# Patient Record
Sex: FEMALE | Race: WHITE | HISPANIC OR LATINO | Employment: UNEMPLOYED | URBAN - METROPOLITAN AREA
[De-identification: names, ages, dates, MRNs, and addresses within clinical notes are randomized per-mention and may not be internally consistent; named-entity substitution may affect disease eponyms.]

---

## 2024-01-23 ENCOUNTER — HOSPITAL ENCOUNTER (INPATIENT)
Facility: MEDICAL CENTER | Age: 29
LOS: 17 days | End: 2024-02-09
Attending: OBSTETRICS & GYNECOLOGY | Admitting: OBSTETRICS & GYNECOLOGY
Payer: COMMERCIAL

## 2024-01-23 DIAGNOSIS — R10.9 POSTOPERATIVE ABDOMINAL PAIN: ICD-10-CM

## 2024-01-23 DIAGNOSIS — G89.18 POSTOPERATIVE ABDOMINAL PAIN: ICD-10-CM

## 2024-01-23 LAB
A1 MICROGLOB PLACENTAL VAG QL: POSITIVE
ABO GROUP BLD: NORMAL
ALBUMIN SERPL BCP-MCNC: 3.5 G/DL (ref 3.2–4.9)
ALBUMIN/GLOB SERPL: 1 G/DL
ALP SERPL-CCNC: 82 U/L (ref 30–99)
ALT SERPL-CCNC: 7 U/L (ref 2–50)
ANION GAP SERPL CALC-SCNC: 13 MMOL/L (ref 7–16)
AST SERPL-CCNC: 16 U/L (ref 12–45)
BASOPHILS # BLD AUTO: 0.1 % (ref 0–1.8)
BASOPHILS # BLD AUTO: 0.3 % (ref 0–1.8)
BASOPHILS # BLD: 0.02 K/UL (ref 0–0.12)
BASOPHILS # BLD: 0.03 K/UL (ref 0–0.12)
BILIRUB SERPL-MCNC: <0.2 MG/DL (ref 0.1–1.5)
BLD GP AB SCN SERPL QL: NORMAL
BUN SERPL-MCNC: 8 MG/DL (ref 8–22)
CALCIUM ALBUM COR SERPL-MCNC: 9.8 MG/DL (ref 8.5–10.5)
CALCIUM SERPL-MCNC: 9.4 MG/DL (ref 8.5–10.5)
CHLORIDE SERPL-SCNC: 107 MMOL/L (ref 96–112)
CO2 SERPL-SCNC: 19 MMOL/L (ref 20–33)
CREAT SERPL-MCNC: 0.42 MG/DL (ref 0.5–1.4)
CRYSTALS AMN MICRO: NORMAL
CRYSTALS AMN MICRO: NORMAL
EOSINOPHIL # BLD AUTO: 0 K/UL (ref 0–0.51)
EOSINOPHIL # BLD AUTO: 0.06 K/UL (ref 0–0.51)
EOSINOPHIL NFR BLD: 0 % (ref 0–6.9)
EOSINOPHIL NFR BLD: 0.5 % (ref 0–6.9)
ERYTHROCYTE [DISTWIDTH] IN BLOOD BY AUTOMATED COUNT: 43.8 FL (ref 35.9–50)
ERYTHROCYTE [DISTWIDTH] IN BLOOD BY AUTOMATED COUNT: 45.2 FL (ref 35.9–50)
GFR SERPLBLD CREATININE-BSD FMLA CKD-EPI: 135 ML/MIN/1.73 M 2
GLOBULIN SER CALC-MCNC: 3.4 G/DL (ref 1.9–3.5)
GLUCOSE SERPL-MCNC: 83 MG/DL (ref 65–99)
HBV SURFACE AG SER QL: ABNORMAL
HCT VFR BLD AUTO: 35.7 % (ref 37–47)
HCT VFR BLD AUTO: 36.9 % (ref 37–47)
HCV AB SER QL: ABNORMAL
HGB BLD-MCNC: 12.6 G/DL (ref 12–16)
HGB BLD-MCNC: 12.7 G/DL (ref 12–16)
HIV 1+2 AB+HIV1 P24 AG SERPL QL IA: NORMAL
HOLDING TUBE BB 8507: NORMAL
IMM GRANULOCYTES # BLD AUTO: 0.07 K/UL (ref 0–0.11)
IMM GRANULOCYTES # BLD AUTO: 0.1 K/UL (ref 0–0.11)
IMM GRANULOCYTES NFR BLD AUTO: 0.6 % (ref 0–0.9)
IMM GRANULOCYTES NFR BLD AUTO: 0.7 % (ref 0–0.9)
LYMPHOCYTES # BLD AUTO: 1.25 K/UL (ref 1–4.8)
LYMPHOCYTES # BLD AUTO: 2.27 K/UL (ref 1–4.8)
LYMPHOCYTES NFR BLD: 20.7 % (ref 22–41)
LYMPHOCYTES NFR BLD: 8.6 % (ref 22–41)
MAGNESIUM SERPL-MCNC: 1.8 MG/DL (ref 1.5–2.5)
MAGNESIUM SERPL-MCNC: 3.9 MG/DL (ref 1.5–2.5)
MAGNESIUM SERPL-MCNC: 4.7 MG/DL (ref 1.5–2.5)
MCH RBC QN AUTO: 31.3 PG (ref 27–33)
MCH RBC QN AUTO: 31.5 PG (ref 27–33)
MCHC RBC AUTO-ENTMCNC: 34.4 G/DL (ref 32.2–35.5)
MCHC RBC AUTO-ENTMCNC: 35.3 G/DL (ref 32.2–35.5)
MCV RBC AUTO: 89.3 FL (ref 81.4–97.8)
MCV RBC AUTO: 90.9 FL (ref 81.4–97.8)
MONOCYTES # BLD AUTO: 0.12 K/UL (ref 0–0.85)
MONOCYTES # BLD AUTO: 0.63 K/UL (ref 0–0.85)
MONOCYTES NFR BLD AUTO: 0.8 % (ref 0–13.4)
MONOCYTES NFR BLD AUTO: 5.7 % (ref 0–13.4)
NEUTROPHILS # BLD AUTO: 13.08 K/UL (ref 1.82–7.42)
NEUTROPHILS # BLD AUTO: 7.93 K/UL (ref 1.82–7.42)
NEUTROPHILS NFR BLD: 72.2 % (ref 44–72)
NEUTROPHILS NFR BLD: 89.8 % (ref 44–72)
NRBC # BLD AUTO: 0 K/UL
NRBC # BLD AUTO: 0 K/UL
NRBC BLD-RTO: 0 /100 WBC (ref 0–0.2)
NRBC BLD-RTO: 0 /100 WBC (ref 0–0.2)
PLATELET # BLD AUTO: 205 K/UL (ref 164–446)
PLATELET # BLD AUTO: 207 K/UL (ref 164–446)
PMV BLD AUTO: 12 FL (ref 9–12.9)
PMV BLD AUTO: 12.1 FL (ref 9–12.9)
POTASSIUM SERPL-SCNC: 3.7 MMOL/L (ref 3.6–5.5)
PROT SERPL-MCNC: 6.9 G/DL (ref 6–8.2)
RBC # BLD AUTO: 4 M/UL (ref 4.2–5.4)
RBC # BLD AUTO: 4.06 M/UL (ref 4.2–5.4)
RH BLD: NORMAL
RUBV AB SER QL: 56.5 IU/ML
SODIUM SERPL-SCNC: 139 MMOL/L (ref 135–145)
T PALLIDUM AB SER QL IA: ABNORMAL
T PALLIDUM AB SER QL IA: NORMAL
WBC # BLD AUTO: 11 K/UL (ref 4.8–10.8)
WBC # BLD AUTO: 14.6 K/UL (ref 4.8–10.8)

## 2024-01-23 PROCEDURE — 87389 HIV-1 AG W/HIV-1&-2 AB AG IA: CPT

## 2024-01-23 PROCEDURE — 84112 EVAL AMNIOTIC FLUID PROTEIN: CPT

## 2024-01-23 PROCEDURE — 36415 COLL VENOUS BLD VENIPUNCTURE: CPT

## 2024-01-23 PROCEDURE — 700102 HCHG RX REV CODE 250 W/ 637 OVERRIDE(OP): Performed by: OBSTETRICS & GYNECOLOGY

## 2024-01-23 PROCEDURE — 86803 HEPATITIS C AB TEST: CPT

## 2024-01-23 PROCEDURE — 87150 DNA/RNA AMPLIFIED PROBE: CPT

## 2024-01-23 PROCEDURE — 700105 HCHG RX REV CODE 258: Performed by: OBSTETRICS & GYNECOLOGY

## 2024-01-23 PROCEDURE — 86780 TREPONEMA PALLIDUM: CPT | Mod: 91

## 2024-01-23 PROCEDURE — 86901 BLOOD TYPING SEROLOGIC RH(D): CPT

## 2024-01-23 PROCEDURE — 85025 COMPLETE CBC W/AUTO DIFF WBC: CPT

## 2024-01-23 PROCEDURE — 770002 HCHG ROOM/CARE - OB PRIVATE (112)

## 2024-01-23 PROCEDURE — 86900 BLOOD TYPING SEROLOGIC ABO: CPT

## 2024-01-23 PROCEDURE — 80053 COMPREHEN METABOLIC PANEL: CPT

## 2024-01-23 PROCEDURE — 86850 RBC ANTIBODY SCREEN: CPT

## 2024-01-23 PROCEDURE — 302449 STATCHG TRIAGE ONLY (STATISTIC)

## 2024-01-23 PROCEDURE — 83735 ASSAY OF MAGNESIUM: CPT | Mod: 91

## 2024-01-23 PROCEDURE — 302790 HCHG STAT ANTEPARTUM CARE, DAILY

## 2024-01-23 PROCEDURE — A9270 NON-COVERED ITEM OR SERVICE: HCPCS | Performed by: OBSTETRICS & GYNECOLOGY

## 2024-01-23 PROCEDURE — 89060 EXAM SYNOVIAL FLUID CRYSTALS: CPT

## 2024-01-23 PROCEDURE — 86762 RUBELLA ANTIBODY: CPT

## 2024-01-23 PROCEDURE — 99223 1ST HOSP IP/OBS HIGH 75: CPT | Performed by: OBSTETRICS & GYNECOLOGY

## 2024-01-23 PROCEDURE — 87340 HEPATITIS B SURFACE AG IA: CPT

## 2024-01-23 PROCEDURE — 86592 SYPHILIS TEST NON-TREP QUAL: CPT

## 2024-01-23 PROCEDURE — 700111 HCHG RX REV CODE 636 W/ 250 OVERRIDE (IP): Performed by: OBSTETRICS & GYNECOLOGY

## 2024-01-23 PROCEDURE — 87081 CULTURE SCREEN ONLY: CPT

## 2024-01-23 RX ORDER — BETAMETHASONE SODIUM PHOSPHATE AND BETAMETHASONE ACETATE 3; 3 MG/ML; MG/ML
12 INJECTION, SUSPENSION INTRA-ARTICULAR; INTRALESIONAL; INTRAMUSCULAR; SOFT TISSUE EVERY 24 HOURS
Status: COMPLETED | OUTPATIENT
Start: 2024-01-23 | End: 2024-01-24

## 2024-01-23 RX ORDER — MAGNESIUM SULFATE HEPTAHYDRATE 40 MG/ML
2 INJECTION, SOLUTION INTRAVENOUS CONTINUOUS
Status: DISCONTINUED | OUTPATIENT
Start: 2024-01-23 | End: 2024-01-25

## 2024-01-23 RX ORDER — OXYTOCIN 10 [USP'U]/ML
10 INJECTION, SOLUTION INTRAMUSCULAR; INTRAVENOUS
Status: DISCONTINUED | OUTPATIENT
Start: 2024-01-23 | End: 2024-02-06 | Stop reason: HOSPADM

## 2024-01-23 RX ORDER — ONDANSETRON 2 MG/ML
4 INJECTION INTRAMUSCULAR; INTRAVENOUS EVERY 6 HOURS PRN
Status: DISCONTINUED | OUTPATIENT
Start: 2024-01-23 | End: 2024-02-06 | Stop reason: HOSPADM

## 2024-01-23 RX ORDER — LIDOCAINE HYDROCHLORIDE 10 MG/ML
20 INJECTION, SOLUTION INFILTRATION; PERINEURAL
Status: DISCONTINUED | OUTPATIENT
Start: 2024-01-23 | End: 2024-02-06 | Stop reason: HOSPADM

## 2024-01-23 RX ORDER — AMOXICILLIN 250 MG/1
250 CAPSULE ORAL EVERY 8 HOURS
Status: COMPLETED | OUTPATIENT
Start: 2024-01-25 | End: 2024-01-30

## 2024-01-23 RX ORDER — ALUMINA, MAGNESIA, AND SIMETHICONE 2400; 2400; 240 MG/30ML; MG/30ML; MG/30ML
10 SUSPENSION ORAL EVERY 6 HOURS PRN
Status: DISCONTINUED | OUTPATIENT
Start: 2024-01-23 | End: 2024-02-06

## 2024-01-23 RX ORDER — DOCUSATE SODIUM 100 MG/1
100 CAPSULE, LIQUID FILLED ORAL 2 TIMES DAILY
Status: DISCONTINUED | OUTPATIENT
Start: 2024-01-23 | End: 2024-02-06

## 2024-01-23 RX ORDER — AMOXICILLIN 250 MG
1 CAPSULE ORAL 2 TIMES DAILY
Status: DISCONTINUED | OUTPATIENT
Start: 2024-01-23 | End: 2024-02-06

## 2024-01-23 RX ORDER — VITAMIN A ACETATE, BETA CAROTENE, ASCORBIC ACID, CHOLECALCIFEROL, .ALPHA.-TOCOPHEROL ACETATE, DL-, THIAMINE MONONITRATE, RIBOFLAVIN, NIACINAMIDE, PYRIDOXINE HYDROCHLORIDE, FOLIC ACID, CYANOCOBALAMIN, CALCIUM CARBONATE, FERROUS FUMARATE, ZINC OXIDE, CUPRIC OXIDE 3080; 12; 120; 400; 1; 1.84; 3; 20; 22; 920; 25; 200; 27; 10; 2 [IU]/1; UG/1; MG/1; [IU]/1; MG/1; MG/1; MG/1; MG/1; MG/1; [IU]/1; MG/1; MG/1; MG/1; MG/1; MG/1
1 TABLET, FILM COATED ORAL
Status: DISCONTINUED | OUTPATIENT
Start: 2024-01-24 | End: 2024-02-06

## 2024-01-23 RX ORDER — AZITHROMYCIN 250 MG/1
1000 TABLET, FILM COATED ORAL ONCE
Status: COMPLETED | OUTPATIENT
Start: 2024-01-23 | End: 2024-01-23

## 2024-01-23 RX ORDER — ONDANSETRON 4 MG/1
4 TABLET, ORALLY DISINTEGRATING ORAL EVERY 6 HOURS PRN
Status: DISCONTINUED | OUTPATIENT
Start: 2024-01-23 | End: 2024-02-06 | Stop reason: HOSPADM

## 2024-01-23 RX ORDER — TERBUTALINE SULFATE 1 MG/ML
0.25 INJECTION, SOLUTION SUBCUTANEOUS
Status: DISCONTINUED | OUTPATIENT
Start: 2024-01-23 | End: 2024-02-06 | Stop reason: HOSPADM

## 2024-01-23 RX ORDER — CALCIUM GLUCONATE 94 MG/ML
1 INJECTION, SOLUTION INTRAVENOUS
Status: DISCONTINUED | OUTPATIENT
Start: 2024-01-23 | End: 2024-02-05

## 2024-01-23 RX ORDER — ALUMINA, MAGNESIA, AND SIMETHICONE 2400; 2400; 240 MG/30ML; MG/30ML; MG/30ML
30 SUSPENSION ORAL EVERY 6 HOURS PRN
Status: DISCONTINUED | OUTPATIENT
Start: 2024-01-23 | End: 2024-01-23

## 2024-01-23 RX ORDER — VITAMIN A ACETATE, BETA CAROTENE, ASCORBIC ACID, CHOLECALCIFEROL, .ALPHA.-TOCOPHEROL ACETATE, DL-, THIAMINE MONONITRATE, RIBOFLAVIN, NIACINAMIDE, PYRIDOXINE HYDROCHLORIDE, FOLIC ACID, CYANOCOBALAMIN, CALCIUM CARBONATE, FERROUS FUMARATE, ZINC OXIDE, CUPRIC OXIDE 3080; 12; 120; 400; 1; 1.84; 3; 20; 22; 920; 25; 200; 27; 10; 2 [IU]/1; UG/1; MG/1; [IU]/1; MG/1; MG/1; MG/1; MG/1; MG/1; [IU]/1; MG/1; MG/1; MG/1; MG/1; MG/1
1 TABLET, FILM COATED ORAL
Status: DISCONTINUED | OUTPATIENT
Start: 2024-01-24 | End: 2024-01-23

## 2024-01-23 RX ORDER — ACETAMINOPHEN 325 MG/1
650 TABLET ORAL EVERY 6 HOURS PRN
Status: DISCONTINUED | OUTPATIENT
Start: 2024-01-23 | End: 2024-02-06

## 2024-01-23 RX ORDER — MAGNESIUM SULFATE HEPTAHYDRATE 40 MG/ML
4 INJECTION, SOLUTION INTRAVENOUS ONCE
Status: COMPLETED | OUTPATIENT
Start: 2024-01-23 | End: 2024-01-23

## 2024-01-23 RX ORDER — CALCIUM CARBONATE 500 MG/1
500 TABLET, CHEWABLE ORAL 4 TIMES DAILY PRN
Status: DISCONTINUED | OUTPATIENT
Start: 2024-01-23 | End: 2024-02-09 | Stop reason: HOSPADM

## 2024-01-23 RX ORDER — SODIUM CHLORIDE, SODIUM LACTATE, POTASSIUM CHLORIDE, CALCIUM CHLORIDE 600; 310; 30; 20 MG/100ML; MG/100ML; MG/100ML; MG/100ML
INJECTION, SOLUTION INTRAVENOUS CONTINUOUS
Status: DISCONTINUED | OUTPATIENT
Start: 2024-01-23 | End: 2024-02-06

## 2024-01-23 RX ADMIN — SODIUM CHLORIDE, POTASSIUM CHLORIDE, SODIUM LACTATE AND CALCIUM CHLORIDE: 600; 310; 30; 20 INJECTION, SOLUTION INTRAVENOUS at 13:30

## 2024-01-23 RX ADMIN — MAGNESIUM SULFATE IN WATER 2 G/HR: 40 INJECTION, SOLUTION INTRAVENOUS at 13:53

## 2024-01-23 RX ADMIN — DOCUSATE SODIUM 100 MG: 100 CAPSULE, LIQUID FILLED ORAL at 18:15

## 2024-01-23 RX ADMIN — AZITHROMYCIN DIHYDRATE 1000 MG: 250 TABLET, FILM COATED ORAL at 15:22

## 2024-01-23 RX ADMIN — DOCUSATE SODIUM 50 MG AND SENNOSIDES 8.6 MG 1 TABLET: 8.6; 5 TABLET, FILM COATED ORAL at 18:15

## 2024-01-23 RX ADMIN — AMPICILLIN SODIUM 2000 MG: 2 INJECTION, POWDER, FOR SOLUTION INTRAVENOUS at 15:26

## 2024-01-23 RX ADMIN — BETAMETHASONE SODIUM PHOSPHATE AND BETAMETHASONE ACETATE 12 MG: 3; 3 INJECTION, SUSPENSION INTRA-ARTICULAR; INTRALESIONAL; INTRAMUSCULAR at 13:36

## 2024-01-23 RX ADMIN — AMPICILLIN SODIUM 2000 MG: 2 INJECTION, POWDER, FOR SOLUTION INTRAVENOUS at 21:55

## 2024-01-23 RX ADMIN — MAGNESIUM SULFATE HEPTAHYDRATE 4 G: 4 INJECTION, SOLUTION INTRAVENOUS at 13:33

## 2024-01-23 ASSESSMENT — LIFESTYLE VARIABLES
HOW MANY TIMES IN THE PAST YEAR HAVE YOU HAD 5 OR MORE DRINKS IN A DAY: 0
AVERAGE NUMBER OF DAYS PER WEEK YOU HAVE A DRINK CONTAINING ALCOHOL: 0
CONSUMPTION TOTAL: NEGATIVE
TOTAL SCORE: 0
ON A TYPICAL DAY WHEN YOU DRINK ALCOHOL HOW MANY DRINKS DO YOU HAVE: 0
ALCOHOL_USE: NO
HAVE YOU EVER FELT YOU SHOULD CUT DOWN ON YOUR DRINKING: NO
TOTAL SCORE: 0
TOTAL SCORE: 0
EVER HAD A DRINK FIRST THING IN THE MORNING TO STEADY YOUR NERVES TO GET RID OF A HANGOVER: NO
EVER FELT BAD OR GUILTY ABOUT YOUR DRINKING: NO
HAVE PEOPLE ANNOYED YOU BY CRITICIZING YOUR DRINKING: NO
EVER_SMOKED: NEVER

## 2024-01-23 ASSESSMENT — PATIENT HEALTH QUESTIONNAIRE - PHQ9
2. FEELING DOWN, DEPRESSED, IRRITABLE, OR HOPELESS: NOT AT ALL
SUM OF ALL RESPONSES TO PHQ9 QUESTIONS 1 AND 2: 0
2. FEELING DOWN, DEPRESSED, IRRITABLE, OR HOPELESS: NOT AT ALL
1. LITTLE INTEREST OR PLEASURE IN DOING THINGS: NOT AT ALL
SUM OF ALL RESPONSES TO PHQ9 QUESTIONS 1 AND 2: 0
1. LITTLE INTEREST OR PLEASURE IN DOING THINGS: NOT AT ALL

## 2024-01-23 ASSESSMENT — PAIN SCALES - GENERAL: PAINLEVEL: 0 - NO PAIN

## 2024-01-23 NOTE — CONSULTS
"Maternal Fetal Medicine Consultation    Date of Admission: 24    ID: 28 y.o.  with IUP at 27.5 weeks on date of admission    Primary OB: OB Group in [place]    Reason for consultation: Advanced cervical dilation    HPI: Tiffanie Lujan is a 28 y.o.  at 27.5 weeks admitted with advanced cervical dilation. She has been receiving prenatal care from OBs in [place] but we have very limited records to review (only an ultrasound report). She says she has been seeing them this whole pregnancy but was in [place] for the holidays. She had an ultrasound there last month that reportedly demonstrated a cervical length of 1cm and she was started on vaginal progesterone. She was seen by the OB's in [place] last week and there was concern for cervical funneling and dilation. She was not admitted to the hospital there but was referred to my office where I saw her today.     Ultrasound today demonstrates a breech fetus with funneling of the cervix noted transabdominally. There is funnelling to the external os with the internal os 1.5cm dilated on ultrasound and the external os 2.5cm dilated. She reports a several day history of leakage of clear fluid so a vaginal exam was not done in the office but the MARV was noted to be normal at 11cm. She denies VB. She reports contractions every 10 minutes all day yesterday but only a couple contractions so far today. She denies F/C.      She speaks Sinhala - in office interpretation provided today.    ANC:  -negative NIPT per patient    ROS: 10 systems reviewed and negative except as noted in HPI    OBSTETRIC HISTORY:   FT  x 2, 6-7 pounds. Reports retained placenta after last delivery requiring D&C.     PAST MEDICAL HISTORY: None    PAST SURGICAL HISTORY: Dilation and curettage    SOCIAL HISTORY: Neg x 3, lives in [place]    FAMILY HISTORY: NC    Medications:   PNV  Vaginal progesterone    Allergies:  NKDA    O: /78, BMI 28.3, Ht 5'5\", weight " 170lb, HR 100bpm  Gen: NAD, AAO  CV: well-perfused  Resp: Non-labored  Abd: Gravid, NTTP, No rebound or guarding. No fundal tenderness  Ext: NTTP, edema, 2+DPP  Pelvic: SVE deferred in office due to reported leakage an inability to do a full in office PPROM evaluation    US : Breech EFW 26% 1061g. Posterior placenta without previa. MARV 11.9cm.    Assessment:  - IUP at 27.5 weeks  - Advanced cervical dilation  - Possible PPROM  - Breech presentation  - History of retained placenta in G2 delivery requiring D&C    Plan:  - Eval for PPROM (spec, Amnisure, fern). Latency abx if evidence of PPROM.  - BMZ -  - Magnesium sulfate for neuroprotection  - Neonatology consultation  - MOD - c/s unless fetus becomes cephalic    Reviewed recommendation for inpatient management given advanced dilation and high risk of  delivery. The patient lives over two hours from a facility with a NICU so does need to be inpatient at this time. Patient signed out to my partner Dr. Mota who is on-call for the hospital. Patient also discussed with Dr. Oliveros - appreciate their group's assistance in care of the patient/delivery if indicated during this admission.     Eva Lee MD

## 2024-01-23 NOTE — PROGRESS NOTES
Medication history reviewed with PT and her  at bedside. Her  interpreted.    Med rec is complete per PT and  reporting    Allergies reviewed.     Patient denies any outpatient antibiotics in the last 30 days.     Patient is not taking anticoagulants.    Preferred pharmacy for this visit - [facility] in [place] ([phone])

## 2024-01-23 NOTE — H&P
Admission history and physical;    Tiffanie Lujan is a 28 y.o. female  at 27w5d.  Patient was seen and evaluated by [facility]  maternal-fetal medicine specialist Dr. Lee this morning and ultrasound shows internal cervical os dilated to 1.5 cm patient complaining of infrequent uterine contractions but also complains of possible leakage of fluid x 72 hours.  The patient lives and has been followed by her physician in [facility].  On admission, patient denies uterine contractions.  States she is having good fetal movements denies abdominal pain fever or chills.  Patient does not have a history of  labor during this pregnancy until this week.    Patient has a history of 2 prior term deliveries    Past medical history-negative  Past surgical history-D&C after delivery due to retained products of conception  No known drug allergies  Medications-prenatal vitamins      Patient Active Problem List    Diagnosis Date Noted    Labor and delivery, indication for care 2024       Review of systems; denies vaginal bleeding, leakage of fluid, uterine contractions, fever chills or abdominal pain  History reviewed. No pertinent past medical history.  Past Surgical History:   Procedure Laterality Date    OTHER      D&C, 40 days postpartum in      Patient has no known allergies.  Social History     Socioeconomic History    Marital status:      Spouse name: Not on file    Number of children: Not on file    Years of education: Not on file    Highest education level: Not on file   Occupational History    Not on file   Tobacco Use    Smoking status: Never    Smokeless tobacco: Never   Vaping Use    Vaping Use: Never used   Substance and Sexual Activity    Alcohol use: Not Currently     Comment: occasional    Drug use: Never    Sexual activity: Not on file   Other Topics Concern    Not on file   Social History Narrative    Not on file     Social Determinants of Health     Financial Resource Strain: Not  "on file   Food Insecurity: Not on file   Transportation Needs: Not on file   Physical Activity: Not on file   Stress: Not on file   Social Connections: Not on file   Intimate Partner Violence: Not on file   Housing Stability: Not on file         Physical examination;  Alert and oriented x3  Gen.-well-developed well-nourished female in no apparent distress  HEENT-normocephalic, nontraumatic,EOMI,PERRLA  /69   Pulse 91   Temp 36.7 °C (98 °F) (Temporal)   Resp 16   Ht 1.651 m (5' 5\")   Wt 76.2 kg (167 lb 15.9 oz)   SpO2 96%   BMI 27.96 kg/m²   Skin is warm and dry  Back-negative for CVA tenderness  Cardiovascular-regular rate and rhythm, normal S1-S2 no murmurs gallops  Lungs-clear to auscultation bilaterally  Abdomen-nondistended positive bowel sounds soft nontender without masses or hepatosplenomegaly  Cervix-sterile speculum exam performed by CRISTIANE bautista and AmniSure collected-very small amount of fluid and vaginal discharge noted in posterior vaginal fornix  Extremities without cyanosis clubbing or edema  Neurologic grossly intact    Labs; AmniSure positive, ferning negative  Breech presentation on ultrasound, estimated fetal weight 1061 g posterior placenta without previa  MARV 11.9    NST-as performed and read by myself; reactive NST without contractions    Impression;  IUP AT 27w5d   cervical dilatation   premature spontaneous rupture membranes-?  Breech  Desire for permanent sterilization    Plan;  NICU consult requested-discussed case with Dr. Petty  Latency antibiotics  Betamethasone ,   Magnesium sulfate  Patient has private insurance-bilateral salpingectomy will be performed as desired if  sections performed  Expectant management until 34 weeks    Carter Oliveros MD  "

## 2024-01-24 LAB
GP B STREP DNA SPEC QL NAA+PROBE: NEGATIVE
MAGNESIUM SERPL-MCNC: 5.5 MG/DL (ref 1.5–2.5)
MAGNESIUM SERPL-MCNC: 5.7 MG/DL (ref 1.5–2.5)
MAGNESIUM SERPL-MCNC: 6 MG/DL (ref 1.5–2.5)

## 2024-01-24 PROCEDURE — 770002 HCHG ROOM/CARE - OB PRIVATE (112)

## 2024-01-24 PROCEDURE — A9270 NON-COVERED ITEM OR SERVICE: HCPCS | Performed by: OBSTETRICS & GYNECOLOGY

## 2024-01-24 PROCEDURE — 83735 ASSAY OF MAGNESIUM: CPT | Mod: 91

## 2024-01-24 PROCEDURE — 700111 HCHG RX REV CODE 636 W/ 250 OVERRIDE (IP): Performed by: OBSTETRICS & GYNECOLOGY

## 2024-01-24 PROCEDURE — 36415 COLL VENOUS BLD VENIPUNCTURE: CPT

## 2024-01-24 PROCEDURE — 302790 HCHG STAT ANTEPARTUM CARE, DAILY

## 2024-01-24 PROCEDURE — 700105 HCHG RX REV CODE 258: Performed by: OBSTETRICS & GYNECOLOGY

## 2024-01-24 PROCEDURE — 700102 HCHG RX REV CODE 250 W/ 637 OVERRIDE(OP): Performed by: OBSTETRICS & GYNECOLOGY

## 2024-01-24 RX ADMIN — AMPICILLIN SODIUM 2000 MG: 2 INJECTION, POWDER, FOR SOLUTION INTRAVENOUS at 03:20

## 2024-01-24 RX ADMIN — DOCUSATE SODIUM 50 MG AND SENNOSIDES 8.6 MG 1 TABLET: 8.6; 5 TABLET, FILM COATED ORAL at 17:57

## 2024-01-24 RX ADMIN — AMPICILLIN SODIUM 2000 MG: 2 INJECTION, POWDER, FOR SOLUTION INTRAVENOUS at 20:43

## 2024-01-24 RX ADMIN — BETAMETHASONE SODIUM PHOSPHATE AND BETAMETHASONE ACETATE 12 MG: 3; 3 INJECTION, SUSPENSION INTRA-ARTICULAR; INTRALESIONAL; INTRAMUSCULAR at 14:05

## 2024-01-24 RX ADMIN — AMPICILLIN SODIUM 2000 MG: 2 INJECTION, POWDER, FOR SOLUTION INTRAVENOUS at 14:38

## 2024-01-24 RX ADMIN — DOCUSATE SODIUM 100 MG: 100 CAPSULE, LIQUID FILLED ORAL at 17:57

## 2024-01-24 RX ADMIN — AMPICILLIN SODIUM 2000 MG: 2 INJECTION, POWDER, FOR SOLUTION INTRAVENOUS at 09:16

## 2024-01-24 RX ADMIN — MAGNESIUM SULFATE IN WATER 2 G/HR: 40 INJECTION, SOLUTION INTRAVENOUS at 09:02

## 2024-01-24 RX ADMIN — SODIUM CHLORIDE, POTASSIUM CHLORIDE, SODIUM LACTATE AND CALCIUM CHLORIDE: 600; 310; 30; 20 INJECTION, SOLUTION INTRAVENOUS at 16:47

## 2024-01-24 RX ADMIN — DOCUSATE SODIUM 100 MG: 100 CAPSULE, LIQUID FILLED ORAL at 06:23

## 2024-01-24 RX ADMIN — DOCUSATE SODIUM 50 MG AND SENNOSIDES 8.6 MG 1 TABLET: 8.6; 5 TABLET, FILM COATED ORAL at 06:23

## 2024-01-24 RX ADMIN — SODIUM CHLORIDE, POTASSIUM CHLORIDE, SODIUM LACTATE AND CALCIUM CHLORIDE: 600; 310; 30; 20 INJECTION, SOLUTION INTRAVENOUS at 02:12

## 2024-01-24 RX ADMIN — VITAMIN A, VITAMIN C, VITAMIN D, VITAMIN E, THIAMINE, RIBOFLAVIN, NIACIN, VITAMIN B6, FOLIC ACID, VITAMIN B12, CALCIUM, IRON, ZINC, COPPER 1 TABLET: 4000; 120; 400; 22; 1.84; 3; 20; 10; 1; 12; 200; 27; 25; 2 TABLET ORAL at 09:14

## 2024-01-24 NOTE — PROGRESS NOTES
1900_ assumed pt care. Report from Barbie TREVINO RN. POC reviewed and understanding verbalized.     2000_ Assessment completed. Pt denies UC's, LOF or VB. She reports +FM.    2015_ Dr Mota @ bedside. SSVE performed by MD. Shepherd collected and sent to lab.    0700_ Report to CRISTIANE Block

## 2024-01-24 NOTE — PROGRESS NOTES
"Event Note    S:  Pt is a 29 yo  at 27 5/7 weeks gestation (working LUANA 24) currently admitted with evidence of  cervical dilation and possible PPROM. (Encounter and exam performed with female RN at bedside as chaperone). Pt lives in [facility].     Pt reports leakage of clear discharge since 24. MARV today in clinic within normal limits with ultrasound evidence of roughly 2.5 cm of dilation at the external cervical os. Fetus noted to be in BREECH presentation at that time.    The patient denies any concern for CTX, VB, or irritation currently. Denies f/c, N/V. Reports active fetal movement.     Amnisure from admission positive and negative fern, prompting repeat assessment now.     O:  /68   Pulse (!) 102   Temp 36.9 °C (98.5 °F) (Temporal)   Resp 15   Ht 1.651 m (5' 5\")   Wt 76.2 kg (167 lb 15.9 oz)   SpO2 94%   BMI 27.96 kg/m²     Gen: well appearing, NAD  CV: regular rhythm   Pulm: normal work of breathing.   Abd: soft, gravid, NT, ND  GYN (exam performed with female RN at bedside as chaperone) - normal external genitalia and vaginal epithelia without lesions. Cervix visualized incompletely due to redundant vaginal tissue. Cervix appeared to be at least 3 cm dilated with prolapsing membranes at level of external os. Physiologic appearing discharge present, without obvious pooling. Discharge sent to lab for evaluation of ferning. No nitrazine paper available to aid in assessment.     EFM - 130 bpm / moderate variability / + accelerations / no decelerations.   Holt - rare irritability / CTX. Most recently quiet.     A/P - 29 yo  at 27w5d admitted with asymptomatic advanced cervical dilation and possible rupture of membranes (LOF since 24 pre pt report). Reviewed with patient high risk for  delivery and rupture of membranes, if this has not already occurred, given evidence of dilation on exam. Patient met with Neonatology earlier today who reviewed with the " patient risks associated with prematurity. At this time patient denies any questions in this regard. Currently denies symptoms of infection, abruption, or labor. Plan for ongoing close observation as inpatient.     S/p Neonatology consultation earlier today.   Continued Magnesium for neuroprotection.  BMZ -  Reasonable to continue Latency antibiotics for now given equivocal PPROM work-up, including my repeat exam now.  Follow-up repeat fern collected now.   FM: CFM  MOD -  delivery unless fetus becomes cephalic.     Dispo: plan for ongoing inpatient management. Case discussed with Dr. Oliveros ([facility]). Greatly appreciate ongoing help in management/ delivery if indicated during admission.     Immanuel Mota MD  [facility]

## 2024-01-24 NOTE — PROGRESS NOTES
1350 Report received from CRISTIANE Block. Care assumed    1654 Phlebotomy contacted about scheduled lab draw.    1645 Pt denies any HA, blurry vision, or pain in RUQ.    1905 Report given to CRISTIANE Ramirez. Care relinquished.

## 2024-01-24 NOTE — PROGRESS NOTES
0700: Report received from CRISTIANE Reddy.   0730: Pt reports +FM, denies contractions but endorses short episode of cramping overnight. Pt reports leaking a small amount of clear fluid, denies bleeding. Pt educated to call RN with any concerns.  1350: Report given to CRISTIANE Bryant.

## 2024-01-24 NOTE — PROGRESS NOTES
LUANA: 2024 27.5 weeks    1202: Pt presents to L&D after being sent over from [facility]. Pt with noted shortened cervix and dilated per US. Pt reports possible LOF on  (24) and occasional UCs. Denies VB. Vital signs obtained. External monitors applied. Discussed POC with pt and family. Encouraged pt to call with needs.     1333: Magnesium sulfate bolus initiated. (See MAR).     1600: Neonatologist at bedside. POC discussed.     : Report given to Maureen BORGES RN. POC discussed.

## 2024-01-24 NOTE — CONSULTS
Asked to talk with this family by Dr Oliveros regarding Possible delivery at 27-5/7 weeks gestation.    Mother is 27 YO,  currently @ 27-5/7 weeks presents with PTL, dilated to 1.5 cm and possible leakage of fluid for 72 hrs.    Parents were told about possible RDS which could mean anything from tachypnea to needing oxygen, CPAP or intubation and ventilation. They were told we could give the baby surfactant if the baby is intubated. They were told that most babies are only on the ventilator for a short period of time, but some babies develop CLD which could cause the baby to be ventilated for a prolonged period of time and could also cause asthma-like wheezing.  They were told about a possible PDA which could be treated medically or surgically.  They were told about possible NEC, which could be treated medically or surgically. They were told about the protective effects of MBM . Mother is planning on supplying MBM for the baby and will consent to DBM  They were told about possible feeding intolerance  They were told about possible IVH and the need for U/S. They were told even with out IVH, the baby is at risk for brain injury and developmental delay, MRCP, autism, ADHD, learning disabilities, etc.  They were told about possible ROP, the need for eye exams and possible need for laser surgery.  They were told that the baby would likely be in the NICU until the LUANA, but could go home sooner or stay longer depending on the clinical course    If they have any further questions please contact me    Marcus Petty MD  Staff neonatologist  [facility].

## 2024-01-24 NOTE — NST NOTE
EFM Note (FHT reviewed overnight)    Tiffanie Lujan   Gestational age: 27w6d     Indication: Advanced cervical dilation and questionable PPROM.     NST Interpretation:  Baseline 125 bpm / moderate variability / + accerlerations / no decelerations.   Edmund: no contractions.    Plan: CFM through BMZ with intermittent breaks.     Immanuel Mota MD  [facility]

## 2024-01-24 NOTE — PROGRESS NOTES
KIN  PROGRESS NOTE    DATE: 24  Gestational Age: 27w6d, Estimated Date of Delivery: 24    Primary OB/GYN: PNC with OB in [place]. Current care with [facility].     SUBJECTIVE  Tiffanie Lujan is a 28 y.o.  at 27w6d (working LUANA 24) admitted with advanced cervical dilation and possible ROM. (Language Line  # [id number])    Patient endorses active fetal movement. Reports intermittent cramping / pressure around 0430 AM overnight. Currently denies any cramping, contractions, pelvic pressure, or vaginal bleeding. Has continued to indorse a small amount of clear discharged without associated odor or irritation. Denies headaches, shortness of breath, chest pain, or abdominal pain. Reports feeling mildly dizzy this AM. Voiding without issues.    OBJECTIVE:  Vitals:    24 0715   BP: 116/78   Pulse: 99   Resp: 16   Temp: 36.4 °C (97.6 °F)   SpO2: 94%      SpO2 - 98% on room air at the bedside.    PHYSICAL EXAM:  General:   Alert, conversational, pleasant, no acute distress  Lungs:   Normal work of breathing  Heart:   RRR, no mrg.  Abdomen:  Soft, gravid, non-tender, non-distended    OB Ultrasound:  Outpatient ultrasound (24) - BREECH, EFW 1061g (26%), posterior placenta without previa. MARV 11.9 cm.      FHT: See separate NST report    Medications:   Current Facility-Administered Medications   Medication Dose Route Frequency Provider Last Rate Last Admin    lidocaine (XYLOCAINE) 1%  injection  20 mL Subcutaneous Once PRN Carter Oliveros M.D.        terbutaline (Brethine) injection 0.25 mg  0.25 mg Subcutaneous Once PRN Carter Oliveros M.D.        oxytocin (Pitocin) infusion bolus (for post delivery)  20 Units Intravenous Once Carter Oliveros M.D.   Held at 24 1300    Followed by    oxytocin (Pitocin) infusion (for post delivery)  125 mL/hr Intravenous Continuous Carter Oliveros M.D.   Held at 24 1345    oxytocin (Pitocin) injection 10 Units   10 Units Intramuscular Once PRN Carter Oliveros M.D.        calcium GLUConate injection 1 g  1 g Intravenous Once PRN Carter Oliveros M.D.        magnesium sulfate 40 g/1000mL infusion  2 g/hr Intravenous Continuous Carter Oliveros M.D. 50 mL/hr at 01/23/24 1353 2 g/hr at 01/23/24 1353    ondansetron (Zofran ODT) dispertab 4 mg  4 mg Oral Q6HRS PRN Carter Oliveros M.D.        Or    ondansetron (Zofran) syringe/vial injection 4 mg  4 mg Intravenous Q6HRS PRN Carter Oliveros M.D.        acetaminophen (Tylenol) tablet 650 mg  650 mg Oral Q6HRS PRN Carter Oliveros M.D.        calcium carbonate (Tums) chewable tab 500 mg  500 mg Oral 4X/DAY PRN Carter Oliveros M.D.        betamethasone acetate-betamethasone sodium phosphate (Celestone) injection 12 mg  12 mg Intramuscular Q24HR Carter Oliveros M.D.   12 mg at 01/23/24 1336    lactated ringers infusion   Intravenous Continuous Carter Oliveros M.D. 75 mL/hr at 01/24/24 0212 New Bag at 01/24/24 0212    ampicillin (Omnipen) 2,000 mg in  mL IVPB  2,000 mg Intravenous Q6HR Carter Oliveros M.D.   Stopped at 01/24/24 0350    Followed by    [START ON 1/25/2024] amoxicillin (Amoxil) capsule 250 mg  250 mg Oral Q8HRS Carter Oliveros M.D.        prenatal plus vitamin (Stuartnatal 1+1) 27-1 MG tablet 1 Tablet  1 Tablet Oral Daily-0800 Carter Oliveros M.D.        docusate sodium (Colace) capsule 100 mg  100 mg Oral BID Carter Oliveros M.D.   100 mg at 01/24/24 0623    senna-docusate (Pericolace Or Senokot S) 8.6-50 MG per tablet 1 Tablet  1 Tablet Oral BID Carter Oliveros M.D.   1 Tablet at 01/24/24 0623    mag hydrox-al hydrox-simeth (Maalox Plus Es Or Mylanta Ds) suspension 10 mL  10 mL Oral Q6HRS PRN Carter Oliveros M.D.            Labs:   Recent Results (from the past 24 hour(s))   AMNISURE ROM ASSAY    Collection Time: 01/23/24 12:50 PM   Result Value Ref Range    AmniSure ROM Positive (AA) Negative   FERN TEST    Collection Time: 01/23/24 12:50 PM   Result Value Ref Range     Fern Test On Amniotic Fluid see below Not present   Hold Blood Bank Specimen (Not Tested)    Collection Time: 01/23/24  1:22 PM   Result Value Ref Range    Holding Tube - Bb DONE    CBC with differential    Collection Time: 01/23/24  1:22 PM   Result Value Ref Range    WBC 11.0 (H) 4.8 - 10.8 K/uL    RBC 4.06 (L) 4.20 - 5.40 M/uL    Hemoglobin 12.7 12.0 - 16.0 g/dL    Hematocrit 36.9 (L) 37.0 - 47.0 %    MCV 90.9 81.4 - 97.8 fL    MCH 31.3 27.0 - 33.0 pg    MCHC 34.4 32.2 - 35.5 g/dL    RDW 45.2 35.9 - 50.0 fL    Platelet Count 205 164 - 446 K/uL    MPV 12.0 9.0 - 12.9 fL    Neutrophils-Polys 72.20 (H) 44.00 - 72.00 %    Lymphocytes 20.70 (L) 22.00 - 41.00 %    Monocytes 5.70 0.00 - 13.40 %    Eosinophils 0.50 0.00 - 6.90 %    Basophils 0.30 0.00 - 1.80 %    Immature Granulocytes 0.60 0.00 - 0.90 %    Nucleated RBC 0.00 0.00 - 0.20 /100 WBC    Neutrophils (Absolute) 7.93 (H) 1.82 - 7.42 K/uL    Lymphs (Absolute) 2.27 1.00 - 4.80 K/uL    Monos (Absolute) 0.63 0.00 - 0.85 K/uL    Eos (Absolute) 0.06 0.00 - 0.51 K/uL    Baso (Absolute) 0.03 0.00 - 0.12 K/uL    Immature Granulocytes (abs) 0.07 0.00 - 0.11 K/uL    NRBC (Absolute) 0.00 K/uL   T.PALLIDUM AB RUPA (Syphilis)    Collection Time: 01/23/24  1:22 PM   Result Value Ref Range    Syphilis, Treponemal Qual Non-Reactive Non-Reactive   COMP METABOLIC PANEL    Collection Time: 01/23/24  1:22 PM   Result Value Ref Range    Sodium 139 135 - 145 mmol/L    Potassium 3.7 3.6 - 5.5 mmol/L    Chloride 107 96 - 112 mmol/L    Co2 19 (L) 20 - 33 mmol/L    Anion Gap 13.0 7.0 - 16.0    Glucose 83 65 - 99 mg/dL    Bun 8 8 - 22 mg/dL    Creatinine 0.42 (L) 0.50 - 1.40 mg/dL    Calcium 9.4 8.5 - 10.5 mg/dL    Correct Calcium 9.8 8.5 - 10.5 mg/dL    AST(SGOT) 16 12 - 45 U/L    ALT(SGPT) 7 2 - 50 U/L    Alkaline Phosphatase 82 30 - 99 U/L    Total Bilirubin <0.2 0.1 - 1.5 mg/dL    Albumin 3.5 3.2 - 4.9 g/dL    Total Protein 6.9 6.0 - 8.2 g/dL    Globulin 3.4 1.9 - 3.5 g/dL    A-G  Ratio 1.0 g/dL   SERUM MAGNESIUM LEVELS    Collection Time: 01/23/24  1:22 PM   Result Value Ref Range    Magnesium 1.8 1.5 - 2.5 mg/dL   ESTIMATED GFR    Collection Time: 01/23/24  1:22 PM   Result Value Ref Range    GFR (CKD-EPI) 135 >60 mL/min/1.73 m 2   OP Prenatal Panel-Blood Bank    Collection Time: 01/23/24  1:22 PM   Result Value Ref Range    ABO Grouping Only O     Rh Grouping Only POS     Antibody Screen Scrn NEG    SERUM MAGNESIUM LEVELS    Collection Time: 01/23/24  3:24 PM   Result Value Ref Range    Magnesium 3.9 (H) 1.5 - 2.5 mg/dL   Ferning if rupture of membranes suspected    Collection Time: 01/23/24  8:25 PM   Result Value Ref Range    Fern Test On Amniotic Fluid see below Not present   PRENATAL PANEL 3+HIV+HCV    Collection Time: 01/23/24 10:10 PM   Result Value Ref Range    WBC 14.6 (H) 4.8 - 10.8 K/uL    RBC 4.00 (L) 4.20 - 5.40 M/uL    Hemoglobin 12.6 12.0 - 16.0 g/dL    Hematocrit 35.7 (L) 37.0 - 47.0 %    MCV 89.3 81.4 - 97.8 fL    MCH 31.5 27.0 - 33.0 pg    MCHC 35.3 32.2 - 35.5 g/dL    RDW 43.8 35.9 - 50.0 fL    Platelet Count 207 164 - 446 K/uL    MPV 12.1 9.0 - 12.9 fL    Neutrophils-Polys 89.80 (H) 44.00 - 72.00 %    Lymphocytes 8.60 (L) 22.00 - 41.00 %    Monocytes 0.80 0.00 - 13.40 %    Eosinophils 0.00 0.00 - 6.90 %    Basophils 0.10 0.00 - 1.80 %    Immature Granulocytes 0.70 0.00 - 0.90 %    Nucleated RBC 0.00 0.00 - 0.20 /100 WBC    Neutrophils (Absolute) 13.08 (H) 1.82 - 7.42 K/uL    Lymphs (Absolute) 1.25 1.00 - 4.80 K/uL    Monos (Absolute) 0.12 0.00 - 0.85 K/uL    Eos (Absolute) 0.00 0.00 - 0.51 K/uL    Baso (Absolute) 0.02 0.00 - 0.12 K/uL    Immature Granulocytes (abs) 0.10 0.00 - 0.11 K/uL    NRBC (Absolute) 0.00 K/uL    Rubella IgG Antibody 56.50 IU/mL    Hepatitis B Surface Antigen Non-Reactive Non-Reactive    Hepatitis C Antibody Non-Reactive Non-Reactive    Syphilis, Treponemal Qual Non-Reactive Non-Reactive   HIV AG/AB COMBO ASSAY SCREENING    Collection Time: 01/23/24  10:10 PM   Result Value Ref Range    HIV Ag/Ab Combo Assay Non-Reactive Non Reactive   SERUM MAGNESIUM LEVELS    Collection Time: 24 10:10 PM   Result Value Ref Range    Magnesium 4.7 (H) 1.5 - 2.5 mg/dL   SERUM MAGNESIUM LEVELS    Collection Time: 24  4:26 AM   Result Value Ref Range    Magnesium 5.7 (H) 1.5 - 2.5 mg/dL       Imaging: No results found.    ASSESSMENT & PLAN  29 yo  at 27w6d (working LUANA 24) admitted with asymptomatic advanced cervical dilation and possible rupture of membranes (LOF since 24 pre pt report). Currently denies symptoms of infection, abruption, or labor. Plan for ongoing close observation as inpatient.     Assessment:  - IUP at 27w6d  - Advanced cervical dilation  - Possible PPROM - + amnisure / negative fern on repeat exam overnight.  - Breech presentation  - History of retained placenta in G2 delivery requiring D&C    Plan:   S/p Neonatology consultation 24.   Continued Magnesium for neuroprotection. Last Mag level 5.6. Adequate UOPT noted. Next Mag level ~ 10 AM.   BMZ -  Reasonable to continue Latency antibiotics given equivocal work-up.  FM: CFM  MOD -  delivery unless fetus becomes cephalic.     Plan of care discussed with the patient and she is in agreement with this plan. All questions and concerns were answered.     Doug Mota M.D.

## 2024-01-25 LAB
GLUCOSE BLD STRIP.AUTO-MCNC: 117 MG/DL (ref 65–99)
GLUCOSE BLD STRIP.AUTO-MCNC: 160 MG/DL (ref 65–99)
MAGNESIUM SERPL-MCNC: 2.8 MG/DL (ref 1.5–2.5)

## 2024-01-25 PROCEDURE — 302790 HCHG STAT ANTEPARTUM CARE, DAILY

## 2024-01-25 PROCEDURE — 82962 GLUCOSE BLOOD TEST: CPT | Mod: 91

## 2024-01-25 PROCEDURE — 770002 HCHG ROOM/CARE - OB PRIVATE (112)

## 2024-01-25 PROCEDURE — 700105 HCHG RX REV CODE 258: Performed by: OBSTETRICS & GYNECOLOGY

## 2024-01-25 PROCEDURE — 83735 ASSAY OF MAGNESIUM: CPT

## 2024-01-25 PROCEDURE — 59025 FETAL NON-STRESS TEST: CPT

## 2024-01-25 PROCEDURE — 36415 COLL VENOUS BLD VENIPUNCTURE: CPT

## 2024-01-25 PROCEDURE — A9270 NON-COVERED ITEM OR SERVICE: HCPCS | Performed by: OBSTETRICS & GYNECOLOGY

## 2024-01-25 PROCEDURE — 700111 HCHG RX REV CODE 636 W/ 250 OVERRIDE (IP): Performed by: OBSTETRICS & GYNECOLOGY

## 2024-01-25 PROCEDURE — 700102 HCHG RX REV CODE 250 W/ 637 OVERRIDE(OP): Performed by: OBSTETRICS & GYNECOLOGY

## 2024-01-25 RX ADMIN — AMOXICILLIN 250 MG: 250 CAPSULE ORAL at 21:50

## 2024-01-25 RX ADMIN — AMPICILLIN SODIUM 2000 MG: 2 INJECTION, POWDER, FOR SOLUTION INTRAVENOUS at 09:01

## 2024-01-25 RX ADMIN — DOCUSATE SODIUM 100 MG: 100 CAPSULE, LIQUID FILLED ORAL at 06:13

## 2024-01-25 RX ADMIN — AMPICILLIN SODIUM 2000 MG: 2 INJECTION, POWDER, FOR SOLUTION INTRAVENOUS at 02:42

## 2024-01-25 RX ADMIN — DOCUSATE SODIUM 50 MG AND SENNOSIDES 8.6 MG 1 TABLET: 8.6; 5 TABLET, FILM COATED ORAL at 06:13

## 2024-01-25 RX ADMIN — AMOXICILLIN 250 MG: 250 CAPSULE ORAL at 13:56

## 2024-01-25 RX ADMIN — DOCUSATE SODIUM 50 MG AND SENNOSIDES 8.6 MG 1 TABLET: 8.6; 5 TABLET, FILM COATED ORAL at 18:21

## 2024-01-25 RX ADMIN — DOCUSATE SODIUM 100 MG: 100 CAPSULE, LIQUID FILLED ORAL at 18:21

## 2024-01-25 RX ADMIN — VITAMIN A, VITAMIN C, VITAMIN D, VITAMIN E, THIAMINE, RIBOFLAVIN, NIACIN, VITAMIN B6, FOLIC ACID, VITAMIN B12, CALCIUM, IRON, ZINC, COPPER 1 TABLET: 4000; 120; 400; 22; 1.84; 3; 20; 10; 1; 12; 200; 27; 25; 2 TABLET ORAL at 07:45

## 2024-01-25 ASSESSMENT — PATIENT HEALTH QUESTIONNAIRE - PHQ9
2. FEELING DOWN, DEPRESSED, IRRITABLE, OR HOPELESS: NOT AT ALL
SUM OF ALL RESPONSES TO PHQ9 QUESTIONS 1 AND 2: 0
1. LITTLE INTEREST OR PLEASURE IN DOING THINGS: NOT AT ALL

## 2024-01-25 NOTE — PROGRESS NOTES
1000 - Report received from Neva SAUER. Care assumed.   1610 - Pt resting comfortably. Pt states continuing to leak clear fluid with some white discharge. No changes in odor or color, denies UCs, +FM.   1900 - Report given to Lydia SAUER.

## 2024-01-25 NOTE — CARE PLAN
The patient is Stable - Low risk of patient condition declining or worsening    Shift Goals  Clinical Goals: manage Mag sulfate, reassuring FHR  Patient Goals: healthy mom  Family Goals: support    Progress made toward(s) clinical / shift goals:    Problem: Knowledge Deficit - L&D  Goal: Patient and family/caregivers will demonstrate understanding of plan of care, disease process/condition, diagnostic tests and medications  Outcome: Progressing     Problem: Pain  Goal: Patient's pain will be alleviated or reduced to the patient’s comfort goal  Outcome: Progressing     Problem: Risk for Fluid Imbalance  Goal: Patient's fluid volume balance will be maintained or improve  Outcome: Progressing       Patient is not progressing towards the following goals:

## 2024-01-25 NOTE — CARE PLAN
Problem: Knowledge Deficit - L&D  Goal: Patient and family/caregivers will demonstrate understanding of plan of care, disease process/condition, diagnostic tests and medications  Outcome: Progressing     Problem: Psychosocial - L&D  Goal: Patient's level of anxiety will decrease  Outcome: Progressing  Goal: Patient will be able to discuss coping skills during hospitalization  Outcome: Progressing  Goal: Spiritual and cultural needs incorporated into hospitalization  Outcome: Progressing     Problem: Pain  Goal: Patient's pain will be alleviated or reduced to the patient’s comfort goal  Outcome: Progressing     Problem: Risk for Infection and Impaired Wound Healing  Goal: Patient will remain free from infection  Outcome: Progressing   The patient is Watcher - Medium risk of patient condition declining or worsening    Shift Goals  Clinical Goals: stay pregnant, remain infection free  Patient Goals: stay pregnant  Family Goals: provide support

## 2024-01-25 NOTE — PROGRESS NOTES
KIN  PROGRESS NOTE    DATE: 24  Gestational Age: 28w0d, Estimated Date of Delivery: 24    Primary OB/GYN: PNC with OB in [place]. Current care with [facility].     SUBJECTIVE  Tiffanie Lujan is a 28 y.o.  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 (Language Line  # [id number])    Patient endorses active fetal movement. Denies any cramping, contractions, pelvic pressure, or vaginal bleeding currently. Reports some pressure overnight around 3 AM. Has continued to indorse a small amount of discharged without associated odor or irritation. Pt reports labia feel somewhat swollen this AM. Denies headaches, shortness of breath, chest pain, or abdominal pain. Reports normal urinary and bowel function.     OBJECTIVE:  Vitals:    24 0739   BP: 108/65   Pulse: 86   Resp: 17   Temp: 36.6 °C (97.9 °F)   SpO2:       PHYSICAL EXAM:  General:   Alert, conversational, pleasant, no acute distress  Lungs:   Normal work of breathing, CTAB.   Heart:   RRR, no mrg.  Abdomen:  Soft, gravid, non-tender, non-distended  GYN (with female RN at bedside as chaperone) - External genitalia without any evidence of swelling or lesions. Evidence of physiologic appearing white discharge with no appreciable odor.     OB Ultrasound:  Outpatient ultrasound (24) - [facility], EFW 1061g (26%), posterior placenta without previa. MARV 11.9 cm.      FHT: See separate NST report    Medications:   Current Facility-Administered Medications   Medication Dose Route Frequency Provider Last Rate Last Admin    lidocaine (XYLOCAINE) 1%  injection  20 mL Subcutaneous Once PRN Carter Oliveros M.D.        terbutaline (Brethine) injection 0.25 mg  0.25 mg Subcutaneous Once PRN Carter Oliveros M.D.        oxytocin (Pitocin) infusion (for post delivery)  125 mL/hr Intravenous Continuous Carter Oliveros M.D.   Held at 24 1345    oxytocin (Pitocin) injection  10 Units  10 Units Intramuscular Once PRN Carter Oliveros M.D.        calcium GLUConate injection 1 g  1 g Intravenous Once PRN Carter Oliveros M.D.        magnesium sulfate 40 g/1000mL infusion  2 g/hr Intravenous Continuous Carter Oliveros M.D.   Stopped at 01/24/24 2100    ondansetron (Zofran ODT) dispertab 4 mg  4 mg Oral Q6HRS PRN Carter Oliveros M.D.        Or    ondansetron (Zofran) syringe/vial injection 4 mg  4 mg Intravenous Q6HRS PRN Carter Oliveros M.D.        acetaminophen (Tylenol) tablet 650 mg  650 mg Oral Q6HRS PRN Carter Oliveros M.D.        calcium carbonate (Tums) chewable tab 500 mg  500 mg Oral 4X/DAY PRN Carter Oliveros M.D.        lactated ringers infusion   Intravenous Continuous Carter Oliveros M.D. 75 mL/hr at 01/24/24 1647 New Bag at 01/24/24 1647    ampicillin (Omnipen) 2,000 mg in  mL IVPB  2,000 mg Intravenous Q6HR Carter Oliveros M.D.   Stopped at 01/25/24 0312    Followed by    amoxicillin (Amoxil) capsule 250 mg  250 mg Oral Q8HRS Carter Oliveros M.D.        prenatal plus vitamin (Stuartnatal 1+1) 27-1 MG tablet 1 Tablet  1 Tablet Oral Daily-0800 Carter Oliveros M.D.   1 Tablet at 01/25/24 0745    docusate sodium (Colace) capsule 100 mg  100 mg Oral BID Carter Oliveros M.D.   100 mg at 01/25/24 0613    senna-docusate (Pericolace Or Senokot S) 8.6-50 MG per tablet 1 Tablet  1 Tablet Oral BID Carter Oliveros M.D.   1 Tablet at 01/25/24 0613    mag hydrox-al hydrox-simeth (Maalox Plus Es Or Mylanta Ds) suspension 10 mL  10 mL Oral Q6HRS PRN Carter Oliveros M.D.            Labs:   Recent Results (from the past 24 hour(s))   SERUM MAGNESIUM LEVELS    Collection Time: 01/24/24 10:55 AM   Result Value Ref Range    Magnesium 6.0 (H) 1.5 - 2.5 mg/dL   SERUM MAGNESIUM LEVELS    Collection Time: 01/24/24  5:00 PM   Result Value Ref Range    Magnesium 5.5 (H) 1.5 - 2.5 mg/dL   MAGNESIUM    Collection Time: 01/25/24  5:09 AM   Result Value Ref Range    Magnesium 2.8 (H) 1.5 - 2.5 mg/dL        Imaging: No results found.    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 Currently denies symptoms of infection, abruption, or labor. Plan for ongoing close observation as inpatient.     Assessment:  - IUP at 28w0d  - Advanced cervical dilation  - Possible PPROM - + amnisure / negative fern on repeat exam overnight.  - Breech presentation  - History of retained placenta in G2 delivery requiring D&C    Plan:   - S/p Neonatology consultation 24.   - S/p Magnesium for fetal neuro protection. Would plan to restart if concerns develop for labor or moving toward delivery < 32 weeks gestation.   - S/p BMZ -  - QID glucose checks, as patient has not yet completed 1hr GCT in pregnancy. Consider completing 1 hr GCT 7-10 days after BMZ.  Reasonable to continue Latency antibiotics given equivocal rupture work-up.  - Encouraged pt to update OB team immediately with any concerns regarding decreased FM, LOF, VB, labor symptoms, or any concerns.  FM: After discussion with patient regarding pros/cons, the patient has elected to transition from CFM to 1hr of fetal monitoring q shift, or with any maternal concerns.   MOD -  delivery unless fetus becomes cephalic.   VTE PPX - ambulation encouraged / SCD's recommended in bed.     Plan of care discussed with the patient and she is in agreement with this plan. All questions and concerns were answered.     [place] Mota M.D.

## 2024-01-25 NOTE — CARE PLAN
The patient is Stable - Low risk of patient condition declining or worsening         Progress made toward(s) clinical / shift goals:  Pt states no current ctx pain at this time, VSS.    Patient is not progressing towards the following goals:      Problem: Risk for Excess Fluid Volume  Goal: Patient will demonstrate pulse, blood pressure and neurologic signs within expected ranges and without any respiratory complications  Outcome: Progressing     Problem: Pain  Goal: Patient's pain will be alleviated or reduced to the patient’s comfort goal  Outcome: Progressing

## 2024-01-25 NOTE — PROGRESS NOTES
0700- Report received from CRISTIANE Ramirez. POC discussed and assumed.   0745- Assessment completed. Pt denies any pain or feeling contractions at this time. Pt reports +FM and denies any VB. She still feels like she is leaking a small amount of fluid and it has remained clear.   0750- Dr Mota at bedside to talk with and assess pt.  IPAD used. Orders to discontinue continuous monitoring and complete a 1 hr NST Q shift. Orders to allow pt to shower and complete fasting BS and 1hr PP. Pt agrees with plan.   1000- Report given to CRISTIANE Chadwick.

## 2024-01-25 NOTE — NST NOTE
EFM Note (FHT reviewed overnight)    Tiffanie Lujan   Gestational age: 27w6d     Indication: Advanced cervical dilation and questionable PPROM.     NST Interpretation:  Baseline 130 bpm / moderate variability / + accerlerations / no decelerations.   [place]: no contractions.    Plan: 1hr EFM q shift.    Immanuel Mota MD  [facility]

## 2024-01-25 NOTE — PROGRESS NOTES
1900: Received report from Manny HERNANDEZ RN.     1920:At bedside, pt denies ctx, does not feel LOF, + FM, - VB. Pt denies blurry vision, HA, and RUQ pain. Pt understands to use call light for any questions.    2050: Svetlana REGAN called to receive update on pt. Orders received to start Mag Sulfate.     2100: Mag stopped ( See MAR) Pt educated on why we are stopping Mag.    0700: Gave report to Neva SAUER

## 2024-01-26 LAB
ABO GROUP BLD: NORMAL
BASOPHILS # BLD AUTO: 0.2 % (ref 0–1.8)
BASOPHILS # BLD: 0.03 K/UL (ref 0–0.12)
BLD GP AB SCN SERPL QL: NORMAL
EOSINOPHIL # BLD AUTO: 0.03 K/UL (ref 0–0.51)
EOSINOPHIL NFR BLD: 0.2 % (ref 0–6.9)
ERYTHROCYTE [DISTWIDTH] IN BLOOD BY AUTOMATED COUNT: 46.9 FL (ref 35.9–50)
GLUCOSE BLD STRIP.AUTO-MCNC: 70 MG/DL (ref 65–99)
HCT VFR BLD AUTO: 35.3 % (ref 37–47)
HGB BLD-MCNC: 11.5 G/DL (ref 12–16)
IMM GRANULOCYTES # BLD AUTO: 0.1 K/UL (ref 0–0.11)
IMM GRANULOCYTES NFR BLD AUTO: 0.8 % (ref 0–0.9)
LYMPHOCYTES # BLD AUTO: 3.12 K/UL (ref 1–4.8)
LYMPHOCYTES NFR BLD: 24.9 % (ref 22–41)
MCH RBC QN AUTO: 30.6 PG (ref 27–33)
MCHC RBC AUTO-ENTMCNC: 32.6 G/DL (ref 32.2–35.5)
MCV RBC AUTO: 93.9 FL (ref 81.4–97.8)
MONOCYTES # BLD AUTO: 1.08 K/UL (ref 0–0.85)
MONOCYTES NFR BLD AUTO: 8.6 % (ref 0–13.4)
NEUTROPHILS # BLD AUTO: 8.17 K/UL (ref 1.82–7.42)
NEUTROPHILS NFR BLD: 65.3 % (ref 44–72)
NRBC # BLD AUTO: 0 K/UL
NRBC BLD-RTO: 0 /100 WBC (ref 0–0.2)
PLATELET # BLD AUTO: 197 K/UL (ref 164–446)
PMV BLD AUTO: 11.3 FL (ref 9–12.9)
RBC # BLD AUTO: 3.76 M/UL (ref 4.2–5.4)
RH BLD: NORMAL
WBC # BLD AUTO: 12.5 K/UL (ref 4.8–10.8)

## 2024-01-26 PROCEDURE — 86850 RBC ANTIBODY SCREEN: CPT

## 2024-01-26 PROCEDURE — 770002 HCHG ROOM/CARE - OB PRIVATE (112)

## 2024-01-26 PROCEDURE — 302790 HCHG STAT ANTEPARTUM CARE, DAILY

## 2024-01-26 PROCEDURE — 59025 FETAL NON-STRESS TEST: CPT

## 2024-01-26 PROCEDURE — 86900 BLOOD TYPING SEROLOGIC ABO: CPT

## 2024-01-26 PROCEDURE — 82962 GLUCOSE BLOOD TEST: CPT

## 2024-01-26 PROCEDURE — 700102 HCHG RX REV CODE 250 W/ 637 OVERRIDE(OP): Performed by: OBSTETRICS & GYNECOLOGY

## 2024-01-26 PROCEDURE — A9270 NON-COVERED ITEM OR SERVICE: HCPCS | Performed by: OBSTETRICS & GYNECOLOGY

## 2024-01-26 PROCEDURE — 36415 COLL VENOUS BLD VENIPUNCTURE: CPT

## 2024-01-26 PROCEDURE — 85025 COMPLETE CBC W/AUTO DIFF WBC: CPT

## 2024-01-26 PROCEDURE — 86901 BLOOD TYPING SEROLOGIC RH(D): CPT

## 2024-01-26 RX ADMIN — AMOXICILLIN 250 MG: 250 CAPSULE ORAL at 14:18

## 2024-01-26 RX ADMIN — AMOXICILLIN 250 MG: 250 CAPSULE ORAL at 22:09

## 2024-01-26 RX ADMIN — VITAMIN A, VITAMIN C, VITAMIN D, VITAMIN E, THIAMINE, RIBOFLAVIN, NIACIN, VITAMIN B6, FOLIC ACID, VITAMIN B12, CALCIUM, IRON, ZINC, COPPER 1 TABLET: 4000; 120; 400; 22; 1.84; 3; 20; 10; 1; 12; 200; 27; 25; 2 TABLET ORAL at 07:58

## 2024-01-26 RX ADMIN — AMOXICILLIN 250 MG: 250 CAPSULE ORAL at 06:01

## 2024-01-26 NOTE — PROGRESS NOTES
"1900: Report received from Mirta ERICKSON RN; POC discussed.    2040: In to see pt; spouse at bedside.  Pt confirms good fetal movements this evening. Pt also mentioned that she leaks \" a little bit when I get up to the bathroom.\" Pt denies cramping and any vaginal bleeding at this time.  Monitors applied for evening NST.    0600: FSBS= 70    0700: Report to Chapis KISER RN; POC discussed.  "

## 2024-01-26 NOTE — NST NOTE
NST Note - FHT reviewed from overnight    Tiffanie Lujan   Gestational age: 28w1d     Indication: admission with advanced cervical dilation.     NST Interpretation:  Baseline 140 bpm, moderate variability, + accelerations, no decelerations.   [place] - quiet     Plan: 1 hr EFM q shift.     Immanuel Mota MD

## 2024-01-26 NOTE — CARE PLAN
The patient is Watcher - Medium risk of patient condition declining or worsening    Shift Goals  Clinical Goals: Monitor s/s of infection  Patient Goals: Stay pregnant  Family Goals: Support    Progress made toward(s) clinical / shift goals:    Problem: Pain  Goal: Patient's pain will be alleviated or reduced to the patient’s comfort goal  Outcome: Progressing     Problem: Risk for Infection and Impaired Wound Healing  Goal: Patient will remain free from infection  Outcome: Progressing

## 2024-01-26 NOTE — PROGRESS NOTES
KIN  PROGRESS NOTE    DATE: 24  Gestational Age: 28w1d, Estimated Date of Delivery: 24    Primary OB/GYN: PNC with OB in [place]. Current care with [facility].     SUBJECTIVE  Tiffanie Lujan is a 28 y.o.  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 (Language Line  # [id number])    Patient endorses active fetal movement. Denies any cramping, contractions, pelvic pressure, or vaginal bleeding. Has continued to report a small amount of clear / thin discharged without associated odor or irritation. Denies headaches, shortness of breath, chest pain, or abdominal pain. Reports normal urinary and bowel function.     OBJECTIVE:  Vitals:    24 0800   BP: 105/61   Pulse: 88   Resp: 16   Temp: 36.4 °C (97.6 °F)   SpO2:       PHYSICAL EXAM:  General:   Alert, conversational, pleasant, no acute distress  Lungs:   Normal work of breathing, CTAB.   Heart:   RRR, no mrg.  Abdomen:  Soft, gravid, non-tender, non-distended    OB Ultrasound:  Outpatient ultrasound (24) - [facility], EFW 1061g (26%), posterior placenta without previa. MARV 11.9 cm.      FHT: See separate NST report    Medications:   Current Facility-Administered Medications   Medication Dose Route Frequency Provider Last Rate Last Admin    lidocaine (XYLOCAINE) 1%  injection  20 mL Subcutaneous Once PRN Carter Oliveros M.D.        terbutaline (Brethine) injection 0.25 mg  0.25 mg Subcutaneous Once PRN Carter Oliveros M.D.        oxytocin (Pitocin) infusion (for post delivery)  125 mL/hr Intravenous Continuous Carter Oliveros M.D.   Held at 24 1345    oxytocin (Pitocin) injection 10 Units  10 Units Intramuscular Once PRN Carter Oliveros M.D.        calcium GLUConate injection 1 g  1 g Intravenous Once PRN Carter Oliveros M.D.        ondansetron (Zofran ODT) dispertab 4 mg  4 mg Oral Q6HRS PRN Carter Oliveros M.D.        Or    ondansetron (Zofran)  syringe/vial injection 4 mg  4 mg Intravenous Q6HRS PRN Carter Oliveros M.D.        acetaminophen (Tylenol) tablet 650 mg  650 mg Oral Q6HRS PRN Carter Oliveros M.D.        calcium carbonate (Tums) chewable tab 500 mg  500 mg Oral 4X/DAY PRN Carter Oliveros M.D.        lactated ringers infusion   Intravenous Continuous Carter Oliveros M.D. 75 mL/hr at 24 1647 New Bag at 24 1647    amoxicillin (Amoxil) capsule 250 mg  250 mg Oral Q8HRS Carter Oliveros M.D.   250 mg at 24 0601    prenatal plus vitamin (Stuartnatal 1+1) 27-1 MG tablet 1 Tablet  1 Tablet Oral Daily-0800 Carter Oliveros M.D.   1 Tablet at 24 0758    docusate sodium (Colace) capsule 100 mg  100 mg Oral BID Carter Oliveros M.D.   100 mg at 24 1821    senna-docusate (Pericolace Or Senokot S) 8.6-50 MG per tablet 1 Tablet  1 Tablet Oral BID Carter Oliveros M.D.   1 Tablet at 24 1821    mag hydrox-al hydrox-simeth (Maalox Plus Es Or Mylanta Ds) suspension 10 mL  10 mL Oral Q6HRS PRN Carter Oliveros M.D.            Labs:   Recent Results (from the past 24 hour(s))   POCT glucose device results    Collection Time: 24  1:08 PM   Result Value Ref Range    POC Glucose, Blood 160 (H) 65 - 99 mg/dL   POCT glucose device results    Collection Time: 24  6:28 PM   Result Value Ref Range    POC Glucose, Blood 117 (H) 65 - 99 mg/dL   POCT glucose device results    Collection Time: 24  6:04 AM   Result Value Ref Range    POC Glucose, Blood 70 65 - 99 mg/dL       Imaging: No results found.    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 Currently denies symptoms of infection, abruption, or labor. Plan for ongoing close observation as inpatient.     Assessment:  - IUP at 28w1d  - Advanced cervical dilation  - Possible PPROM - + amnisure / negative fern on repeat exam overnight.  -  Breech presentation  - History of retained placenta in G2 delivery requiring D&C    Plan:   - S/p Neonatology consultation 24.   - S/p Magnesium for fetal neuro protection. Would plan to restart if concerns develop for labor or moving toward delivery < 32 weeks gestation.   - S/p BMZ -  - Continue to monitor QID glucose checks - intermittently elevated, but mostly at goal. Plan to completing 1 hr GCT 7-10 days after BMZ.  - Reasonable to continue Latency antibiotics given equivocal rupture work-up.  - Encouraged pt to update OB team immediately with any concerns regarding decreased FM, LOF, VB, labor symptoms, or any concerns.  FM: 1hr of fetal monitoring q shift, or with any maternal concerns.   MOD -  delivery unless fetus becomes cephalic.   VTE PPX - ambulation encouraged / SCD's recommended in bed.     Plan of care discussed with the patient and she is in agreement with this plan. All questions and concerns were answered.     [place] Mota M.D.

## 2024-01-27 PROCEDURE — 59025 FETAL NON-STRESS TEST: CPT

## 2024-01-27 PROCEDURE — 700102 HCHG RX REV CODE 250 W/ 637 OVERRIDE(OP): Performed by: OBSTETRICS & GYNECOLOGY

## 2024-01-27 PROCEDURE — 770002 HCHG ROOM/CARE - OB PRIVATE (112)

## 2024-01-27 PROCEDURE — 302790 HCHG STAT ANTEPARTUM CARE, DAILY

## 2024-01-27 PROCEDURE — A9270 NON-COVERED ITEM OR SERVICE: HCPCS | Performed by: OBSTETRICS & GYNECOLOGY

## 2024-01-27 RX ADMIN — DOCUSATE SODIUM 100 MG: 100 CAPSULE, LIQUID FILLED ORAL at 17:44

## 2024-01-27 RX ADMIN — AMOXICILLIN 250 MG: 250 CAPSULE ORAL at 22:00

## 2024-01-27 RX ADMIN — DOCUSATE SODIUM 100 MG: 100 CAPSULE, LIQUID FILLED ORAL at 05:54

## 2024-01-27 RX ADMIN — VITAMIN A, VITAMIN C, VITAMIN D, VITAMIN E, THIAMINE, RIBOFLAVIN, NIACIN, VITAMIN B6, FOLIC ACID, VITAMIN B12, CALCIUM, IRON, ZINC, COPPER 1 TABLET: 4000; 120; 400; 22; 1.84; 3; 20; 10; 1; 12; 200; 27; 25; 2 TABLET ORAL at 08:28

## 2024-01-27 RX ADMIN — DOCUSATE SODIUM 50 MG AND SENNOSIDES 8.6 MG 1 TABLET: 8.6; 5 TABLET, FILM COATED ORAL at 17:44

## 2024-01-27 RX ADMIN — AMOXICILLIN 250 MG: 250 CAPSULE ORAL at 14:35

## 2024-01-27 RX ADMIN — AMOXICILLIN 250 MG: 250 CAPSULE ORAL at 05:52

## 2024-01-27 RX ADMIN — DOCUSATE SODIUM 50 MG AND SENNOSIDES 8.6 MG 1 TABLET: 8.6; 5 TABLET, FILM COATED ORAL at 05:54

## 2024-01-27 NOTE — PROGRESS NOTES
0700: Received report from Alicia SAUER, plan of care discussed. Call light in reach, pt encouraged to use when in need of assistance. Pt requesting to have  translate instead of using .     1800: Report given to Tita SAUER, plan of care discussed.

## 2024-01-27 NOTE — NST NOTE
NST Note - FHT reviewed from this morning    Tiffanie Lujan   Gestational age: 28w2d     Indication: admission with advanced cervical dilation.     NST Interpretation:  Baseline 140 bpm, moderate variability, + accelerations, no decelerations.   [place] - quiet     Plan: 1 hr EFM q shift.

## 2024-01-27 NOTE — PROGRESS NOTES
MFM  PROGRESS NOTE    Primary OB/GYN: PNC with OB in [place]. Current care with [facility].     SUBJECTIVE  No overnight events. + FM. Denies VB and contractions. Patient reports small amount of ongoing clear/thin vaginal discharge. + FM.    ( service used)    OBJECTIVE:  Vitals:    24 0800   BP: 102/61   Pulse: 88   Resp: 16   Temp: 36.3 °C (97.4 °F)   SpO2:       PHYSICAL EXAM:  General:   Alert, conversational, pleasant, no acute distress  Lungs:   Normal work of breathing, CTAB.   Heart:   RRR, no mrg.  Abdomen:  Soft, gravid, non-tender, non-distended    OB Ultrasound:  Outpatient ultrasound (24) - [facility], EFW 1061g (26%), posterior placenta without previa. MARV 11.9 cm.      FHT: See separate NST report    Medications:   Current Facility-Administered Medications   Medication Dose Route Frequency Provider Last Rate Last Admin    lidocaine (XYLOCAINE) 1%  injection  20 mL Subcutaneous Once PRN Carter Oliveros M.D.        terbutaline (Brethine) injection 0.25 mg  0.25 mg Subcutaneous Once PRN Carter Oliveros M.D.        oxytocin (Pitocin) infusion (for post delivery)  125 mL/hr Intravenous Continuous Carter Oliveros M.D.   Held at 24 1345    oxytocin (Pitocin) injection 10 Units  10 Units Intramuscular Once PRN Carter Oliveros M.D.        calcium GLUConate injection 1 g  1 g Intravenous Once PRN Carter Oliveros M.D.        ondansetron (Zofran ODT) dispertab 4 mg  4 mg Oral Q6HRS PRN Carter Oliveros M.D.        Or    ondansetron (Zofran) syringe/vial injection 4 mg  4 mg Intravenous Q6HRS PRN Carter Oliveros M.D.        acetaminophen (Tylenol) tablet 650 mg  650 mg Oral Q6HRS PRN Carter Oliveros M.D.        calcium carbonate (Tums) chewable tab 500 mg  500 mg Oral 4X/DAY PRN Carter Oliveros M.D.        lactated ringers infusion   Intravenous Continuous Carter Oliveros M.D. 75 mL/hr at 24 1647 New Bag at 24 1647    amoxicillin (Amoxil) capsule 250  mg  250 mg Oral Q8HRS Carter Oliveros M.D.   250 mg at 24 0552    prenatal plus vitamin (Stuartnatal 1+1) 27-1 MG tablet 1 Tablet  1 Tablet Oral Daily-0800 Carter Oliveros M.D.   1 Tablet at 24 0828    docusate sodium (Colace) capsule 100 mg  100 mg Oral BID Carter Oliveros M.D.   100 mg at 24 0554    senna-docusate (Pericolace Or Senokot S) 8.6-50 MG per tablet 1 Tablet  1 Tablet Oral BID Carter Oliveros M.D.   1 Tablet at 24 0554    mag hydrox-al hydrox-simeth (Maalox Plus Es Or Mylanta Ds) suspension 10 mL  10 mL Oral Q6HRS PRN Carter Oliveros M.D.            Labs:   No results found for this or any previous visit (from the past 24 hour(s)).      Imaging: No results found.    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 Currently denies symptoms of infection, abruption, or labor. Plan for ongoing close observation as inpatient.     Assessment:  - IUP at 28w2d  - Advanced cervical dilation  - Possible PPROM - + amnisure / negative fern on repeat exam  - Breech presentation  - History of retained placenta in G2 delivery requiring D&C    Plan:   - S/p Neonatology consultation 24.   - S/p Magnesium for fetal neuro protection. Would plan to restart if concerns develop for labor or moving toward delivery < 32 weeks gestation.   - S/p BMZ -  - D/C accuchecks. Plan to completing 1 hr GCT 7-10 days after BMZ.  - Reasonable to continue Latency antibiotics given equivocal rupture work-up.  - MOD -  delivery unless fetus becomes cephalic.   - VTE PPX - ambulation encouraged / SCD's recommended in bed.     Plan of care discussed with the patient and she is in agreement with this plan. All questions and concerns were answered.     Eva Lee M.D.

## 2024-01-27 NOTE — PROGRESS NOTES
1900: Report received from Chapis KISER RN; POC discussed.     2040: In to see pt; monitors placed for evening NST.  Pt states she has leaked a little more than usual today. Pt confirms good fetal movements; denies uterine activity and any vaginal bleeding.  No further complaints.    2212: Evening NST completed; monitors removed.

## 2024-01-28 PROCEDURE — 302790 HCHG STAT ANTEPARTUM CARE, DAILY

## 2024-01-28 PROCEDURE — 700102 HCHG RX REV CODE 250 W/ 637 OVERRIDE(OP): Performed by: OBSTETRICS & GYNECOLOGY

## 2024-01-28 PROCEDURE — A9270 NON-COVERED ITEM OR SERVICE: HCPCS | Performed by: OBSTETRICS & GYNECOLOGY

## 2024-01-28 PROCEDURE — 770002 HCHG ROOM/CARE - OB PRIVATE (112)

## 2024-01-28 PROCEDURE — 59025 FETAL NON-STRESS TEST: CPT

## 2024-01-28 RX ADMIN — VITAMIN A, VITAMIN C, VITAMIN D, VITAMIN E, THIAMINE, RIBOFLAVIN, NIACIN, VITAMIN B6, FOLIC ACID, VITAMIN B12, CALCIUM, IRON, ZINC, COPPER 1 TABLET: 4000; 120; 400; 22; 1.84; 3; 20; 10; 1; 12; 200; 27; 25; 2 TABLET ORAL at 08:02

## 2024-01-28 RX ADMIN — DOCUSATE SODIUM 50 MG AND SENNOSIDES 8.6 MG 1 TABLET: 8.6; 5 TABLET, FILM COATED ORAL at 06:23

## 2024-01-28 RX ADMIN — AMOXICILLIN 250 MG: 250 CAPSULE ORAL at 22:40

## 2024-01-28 RX ADMIN — DOCUSATE SODIUM 100 MG: 100 CAPSULE, LIQUID FILLED ORAL at 18:04

## 2024-01-28 RX ADMIN — DOCUSATE SODIUM 100 MG: 100 CAPSULE, LIQUID FILLED ORAL at 06:23

## 2024-01-28 RX ADMIN — AMOXICILLIN 250 MG: 250 CAPSULE ORAL at 14:01

## 2024-01-28 RX ADMIN — AMOXICILLIN 250 MG: 250 CAPSULE ORAL at 06:23

## 2024-01-28 RX ADMIN — DOCUSATE SODIUM 50 MG AND SENNOSIDES 8.6 MG 1 TABLET: 8.6; 5 TABLET, FILM COATED ORAL at 18:04

## 2024-01-28 ASSESSMENT — PATIENT HEALTH QUESTIONNAIRE - PHQ9
1. LITTLE INTEREST OR PLEASURE IN DOING THINGS: NOT AT ALL
SUM OF ALL RESPONSES TO PHQ9 QUESTIONS 1 AND 2: 0
1. LITTLE INTEREST OR PLEASURE IN DOING THINGS: NOT AT ALL
SUM OF ALL RESPONSES TO PHQ9 QUESTIONS 1 AND 2: 0
2. FEELING DOWN, DEPRESSED, IRRITABLE, OR HOPELESS: NOT AT ALL
2. FEELING DOWN, DEPRESSED, IRRITABLE, OR HOPELESS: NOT AT ALL

## 2024-01-28 NOTE — PROGRESS NOTES
1800 Report received from Keesha RN, POC discussed.    1900 Report given to Nadia SAUER, POC discussed and care relinquished.

## 2024-01-28 NOTE — CARE PLAN
Problem: Knowledge Deficit - L&D  Goal: Patient and family/caregivers will demonstrate understanding of plan of care, disease process/condition, diagnostic tests and medications  Outcome: Progressing   The patient is Stable - Low risk of patient condition declining or worsening    Shift Goals  Clinical Goals: Monitor for PTL and infection  Patient Goals: rest  Family Goals: support

## 2024-01-28 NOTE — PROGRESS NOTES
1900- report received from Tita SAUER  2058- Vannesa REGAN called and asked to review tracing as there were breaks but was overall reactive  2111- Vannesa REGAN permitted to remove monitors as tracing was reactive   0700- report given to Graeme SAUER

## 2024-01-28 NOTE — PROGRESS NOTES
MFELI[facility]  PROGRESS NOTE    Primary OB/GYN: PNC with OB in [place]. Current care with [facility] (they are aware and planning to do the delivery when indicated).     SUBJECTIVE  No overnight events. + FM. Denies VB and contractions. Patient reports constant abdominal pain since 6am this morning - does not come and go. She thought she needed to use the bathroom but the pain continued after she did. + FM.    ( service used - [id number])    OBJECTIVE:  Vitals:    24 0700   BP: 112/69   Pulse: 89   Resp: 20   Temp: 36.4 °C (97.5 °F)   SpO2:       PHYSICAL EXAM:  General:   Alert, conversational, pleasant, no acute distress  Lungs:   Normal work of breathing, CTAB.   Heart:   RRR, no mrg.  Abdomen:  Soft, gravid, non-tender, non-distended    OB Ultrasound:  Outpatient ultrasound (24) - [facility], EFW 1061g (26%), posterior placenta without previa. MARV 11.9 cm.      FHT: See separate NST report    Medications:   Current Facility-Administered Medications   Medication Dose Route Frequency Provider Last Rate Last Admin    lidocaine (XYLOCAINE) 1%  injection  20 mL Subcutaneous Once PRN Carter Oliveros M.D.        terbutaline (Brethine) injection 0.25 mg  0.25 mg Subcutaneous Once PRN Carter Oliveros M.D.        oxytocin (Pitocin) infusion (for post delivery)  125 mL/hr Intravenous Continuous Carter Oliveros M.D.   Held at 24 1345    oxytocin (Pitocin) injection 10 Units  10 Units Intramuscular Once PRN Carter Oliveros M.D.        calcium GLUConate injection 1 g  1 g Intravenous Once PRN Carter Oliveros M.D.        ondansetron (Zofran ODT) dispertab 4 mg  4 mg Oral Q6HRS PRN Carter Oliveros M.D.        Or    ondansetron (Zofran) syringe/vial injection 4 mg  4 mg Intravenous Q6HRS PRN Carter Oliveros M.D.        acetaminophen (Tylenol) tablet 650 mg  650 mg Oral Q6HRS PRN Carter Oliveros M.D.        calcium carbonate (Tums) chewable tab 500 mg  500 mg Oral 4X/DAY PRN Carter Oliveros  M.D.        lactated ringers infusion   Intravenous Continuous Carter Oliveros M.D. 75 mL/hr at 24 1647 New Bag at 24 1647    amoxicillin (Amoxil) capsule 250 mg  250 mg Oral Q8HRS Carter Oliveros M.D.   250 mg at 24 0623    prenatal plus vitamin (Stuartnatal 1+1) 27-1 MG tablet 1 Tablet  1 Tablet Oral Daily-0800 Carter Oliveros M.D.   1 Tablet at 24 0802    docusate sodium (Colace) capsule 100 mg  100 mg Oral BID Carter Oliveros M.D.   100 mg at 24 0623    senna-docusate (Pericolace Or Senokot S) 8.6-50 MG per tablet 1 Tablet  1 Tablet Oral BID Carter Oliveros M.D.   1 Tablet at 24 0623    mag hydrox-al hydrox-simeth (Maalox Plus Es Or Mylanta Ds) suspension 10 mL  10 mL Oral Q6HRS PRN Carter Oliveros M.D.            Labs:   No results found for this or any previous visit (from the past 24 hour(s)).      Imaging: No results found.    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 Currently denies symptoms of infection, abruption, or labor. Plan for ongoing close observation as inpatient.     Assessment:  - IUP at 28w3d  - Advanced cervical dilation  - Possible PPROM - + amnisure / negative fern on repeat exam  - Breech presentation  - History of retained placenta in G2 delivery requiring D&C  - Abdominal pain - no evidence of chorio/labor on the monitor currently.     Plan:   - S/p Neonatology consultation 24.   - S/p Magnesium for fetal neuro protection. Would plan to restart if concerns develop for labor or moving toward delivery < 32 weeks gestation.   - S/p BMZ -  - D/C accuchecks. Plan to complete 1 hr GCT 7-10 days after BMZ.  - Reasonable to continue Latency antibiotics given equivocal rupture work-up.  - MOD -  delivery unless fetus becomes cephalic.   - VTE PPX - ambulation encouraged / SCD's recommended in bed.      Plan of care discussed with the patient and she is in agreement with this plan. All questions and concerns were answered.     Eva Lee M.D.

## 2024-01-28 NOTE — CARE PLAN
The patient is Watcher - Medium risk of patient condition declining or worsening    Shift Goals  Clinical Goals: Healthy mom, healthy baby  Patient Goals: Stay Pregnant  Family Goals: support and translation    Progress made toward(s) clinical / shift goals:    Problem: Knowledge Deficit - L&D  Goal: Patient and family/caregivers will demonstrate understanding of plan of care, disease process/condition, diagnostic tests and medications  Outcome: Progressing     Problem: Pain  Goal: Patient's pain will be alleviated or reduced to the patient’s comfort goal  Outcome: Progressing     Problem: Risk for Infection and Impaired Wound Healing  Goal: Patient's wound/surgical incision will decrease in size and heals properly  Outcome: Progressing

## 2024-01-28 NOTE — PROGRESS NOTES
0700- Report received, care assumed.   0802-POC discussed with pt. Pt denies any contractions, leaking or bleeding. Pt denies any questions or concerns. 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 Phone number confirmed incase of an emergency. He can leave work if needed. If he doesn't answer, leave message, he can get wifi and check it frequently.   1000-Pt called out with c/o abdominal and back pain. Pt reports pain is constant. Pt denies any contraction, leaking or bleeding and reports + FM. POC discussed with pt. pt placed on monitors ( us and toco)  1010- Dr Rosa pearce MD at bedside. IPAD  used Handy ID [id number]   1108-Monitors removed. Pt denies any needs at this time.  1900-Report given to Chacha SAUER

## 2024-01-28 NOTE — NST NOTE
NST Note - FHT reviewed from this morning    Tiffanie Lujan   Gestational age: 28w3d     Indication: admission with advanced cervical dilation.     NST Interpretation:  Baseline 140 bpm, moderate variability, + accelerations, no decelerations.   [place] - quiet     Plan: BID NST

## 2024-01-29 LAB
BASOPHILS # BLD AUTO: 0.3 % (ref 0–1.8)
BASOPHILS # BLD: 0.04 K/UL (ref 0–0.12)
EOSINOPHIL # BLD AUTO: 0.07 K/UL (ref 0–0.51)
EOSINOPHIL NFR BLD: 0.5 % (ref 0–6.9)
ERYTHROCYTE [DISTWIDTH] IN BLOOD BY AUTOMATED COUNT: 44.2 FL (ref 35.9–50)
HCT VFR BLD AUTO: 34.6 % (ref 37–47)
HGB BLD-MCNC: 11.8 G/DL (ref 12–16)
IMM GRANULOCYTES # BLD AUTO: 0.12 K/UL (ref 0–0.11)
IMM GRANULOCYTES NFR BLD AUTO: 0.8 % (ref 0–0.9)
LYMPHOCYTES # BLD AUTO: 2.39 K/UL (ref 1–4.8)
LYMPHOCYTES NFR BLD: 16 % (ref 22–41)
MCH RBC QN AUTO: 31.1 PG (ref 27–33)
MCHC RBC AUTO-ENTMCNC: 34.1 G/DL (ref 32.2–35.5)
MCV RBC AUTO: 91.1 FL (ref 81.4–97.8)
MONOCYTES # BLD AUTO: 0.65 K/UL (ref 0–0.85)
MONOCYTES NFR BLD AUTO: 4.3 % (ref 0–13.4)
NEUTROPHILS # BLD AUTO: 11.71 K/UL (ref 1.82–7.42)
NEUTROPHILS NFR BLD: 78.1 % (ref 44–72)
NRBC # BLD AUTO: 0 K/UL
NRBC BLD-RTO: 0 /100 WBC (ref 0–0.2)
PLATELET # BLD AUTO: 207 K/UL (ref 164–446)
PMV BLD AUTO: 11.9 FL (ref 9–12.9)
RBC # BLD AUTO: 3.8 M/UL (ref 4.2–5.4)
WBC # BLD AUTO: 15 K/UL (ref 4.8–10.8)

## 2024-01-29 PROCEDURE — 770002 HCHG ROOM/CARE - OB PRIVATE (112)

## 2024-01-29 PROCEDURE — 36415 COLL VENOUS BLD VENIPUNCTURE: CPT

## 2024-01-29 PROCEDURE — A9270 NON-COVERED ITEM OR SERVICE: HCPCS | Performed by: OBSTETRICS & GYNECOLOGY

## 2024-01-29 PROCEDURE — 59025 FETAL NON-STRESS TEST: CPT

## 2024-01-29 PROCEDURE — 700102 HCHG RX REV CODE 250 W/ 637 OVERRIDE(OP): Performed by: OBSTETRICS & GYNECOLOGY

## 2024-01-29 PROCEDURE — 302790 HCHG STAT ANTEPARTUM CARE, DAILY

## 2024-01-29 PROCEDURE — 85025 COMPLETE CBC W/AUTO DIFF WBC: CPT

## 2024-01-29 RX ADMIN — AMOXICILLIN 250 MG: 250 CAPSULE ORAL at 22:41

## 2024-01-29 RX ADMIN — DOCUSATE SODIUM 100 MG: 100 CAPSULE, LIQUID FILLED ORAL at 17:47

## 2024-01-29 RX ADMIN — DOCUSATE SODIUM 50 MG AND SENNOSIDES 8.6 MG 1 TABLET: 8.6; 5 TABLET, FILM COATED ORAL at 17:47

## 2024-01-29 RX ADMIN — AMOXICILLIN 250 MG: 250 CAPSULE ORAL at 06:05

## 2024-01-29 RX ADMIN — VITAMIN A, VITAMIN C, VITAMIN D, VITAMIN E, THIAMINE, RIBOFLAVIN, NIACIN, VITAMIN B6, FOLIC ACID, VITAMIN B12, CALCIUM, IRON, ZINC, COPPER 1 TABLET: 4000; 120; 400; 22; 1.84; 3; 20; 10; 1; 12; 200; 27; 25; 2 TABLET ORAL at 08:30

## 2024-01-29 RX ADMIN — AMOXICILLIN 250 MG: 250 CAPSULE ORAL at 14:01

## 2024-01-29 RX ADMIN — ACETAMINOPHEN 650 MG: 325 TABLET, FILM COATED ORAL at 18:01

## 2024-01-29 ASSESSMENT — PATIENT HEALTH QUESTIONNAIRE - PHQ9
SUM OF ALL RESPONSES TO PHQ9 QUESTIONS 1 AND 2: 0
1. LITTLE INTEREST OR PLEASURE IN DOING THINGS: NOT AT ALL
2. FEELING DOWN, DEPRESSED, IRRITABLE, OR HOPELESS: NOT AT ALL

## 2024-01-29 NOTE — CARE PLAN
The patient is Stable - Low risk of patient condition declining or worsening    Shift Goals  Clinical Goals: Monitor for PTL and infection  Patient Goals: Stay pregnant; Rest  Family Goals: Support    Progress made toward(s) clinical / shift goals:  patient denies any contractions/ Cramping. Patient denies any abdomen tenderness or S&S of fevers, chills     Patient is not progressing towards the following goals:n/a

## 2024-01-29 NOTE — PROGRESS NOTES
1900: Report from Graeme PRAJAPATI POC discussed, assumed care of pt  0700: Report to Eliceo PRAJAPATI.

## 2024-01-29 NOTE — NST NOTE
Tiffanie Lujan   Gestational age: 28w3d     Indication: admission with advanced cervical dilation, possible PPROM.     NST Interpretation:  Baseline 130-140 bpm, moderate variability, + accelerations, no decelerations.   [place] - quiet     Plan: BID NST

## 2024-01-29 NOTE — PROGRESS NOTES
MFM  PROGRESS NOTE    Primary OB/GYN: PNC with OB in [place]. Current care with [facility] (they are aware and planning to do the delivery when indicated).     SUBJECTIVE  No overnight events. Pain from yesterday resolved. + FM. Denies VB and contractions. +LOF, small and clear in amount.    OBJECTIVE:  Vitals:    24 0807   BP: 109/69   Pulse: 96   Resp: 16   Temp: 36.7 °C (98.1 °F)   SpO2:       PHYSICAL EXAM:  General:   Alert, conversational, pleasant, no acute distress  Lungs:   Normal work of breathing, CTAB.   Heart:   RRR, no mrg.  Abdomen:  Soft, gravid, non-tender, non-distended    OB Ultrasound:  Outpatient ultrasound (24) - [facility], EFW 1061g (26%), posterior placenta without previa. MARV 11.9 cm.      FHT: See separate NST report    Medications:   Current Facility-Administered Medications   Medication Dose Route Frequency Provider Last Rate Last Admin    lidocaine (XYLOCAINE) 1%  injection  20 mL Subcutaneous Once PRN Carter Oliveros M.D.        terbutaline (Brethine) injection 0.25 mg  0.25 mg Subcutaneous Once PRN Carter Oliveros M.D.        oxytocin (Pitocin) infusion (for post delivery)  125 mL/hr Intravenous Continuous Carter Oliveros M.D.   Held at 24 1345    oxytocin (Pitocin) injection 10 Units  10 Units Intramuscular Once PRN Carter Oliveros M.D.        calcium GLUConate injection 1 g  1 g Intravenous Once PRN Carter Oliveros M.D.        ondansetron (Zofran ODT) dispertab 4 mg  4 mg Oral Q6HRS PRN Carter Oliveros M.D.        Or    ondansetron (Zofran) syringe/vial injection 4 mg  4 mg Intravenous Q6HRS PRN Carter Oliveros M.D.        acetaminophen (Tylenol) tablet 650 mg  650 mg Oral Q6HRS PRN Carter Oliveros M.D.        calcium carbonate (Tums) chewable tab 500 mg  500 mg Oral 4X/DAY PRN Carter Oliveros M.D.        lactated ringers infusion   Intravenous Continuous Carter Oliveros M.D. 75 mL/hr at 24 1647 New Bag at 24 1647    amoxicillin (Amoxil)  capsule 250 mg  250 mg Oral Q8HRS Carter Oliveros M.D.   250 mg at 24 0605    prenatal plus vitamin (Stuartnatal 1+1) 27-1 MG tablet 1 Tablet  1 Tablet Oral Daily-0800 Carter Oliveros M.D.   1 Tablet at 24 0802    docusate sodium (Colace) capsule 100 mg  100 mg Oral BID Carter Oliveros M.D.   100 mg at 24 1804    senna-docusate (Pericolace Or Senokot S) 8.6-50 MG per tablet 1 Tablet  1 Tablet Oral BID Carter Oliveros M.D.   1 Tablet at 24 1804    mag hydrox-al hydrox-simeth (Maalox Plus Es Or Mylanta Ds) suspension 10 mL  10 mL Oral Q6HRS PRN Carter Oliveros M.D.            Labs:   No results found for this or any previous visit (from the past 24 hour(s)).      Imaging: No results found.    ASSESSMENT & PLAN  27 yo  at 28w4d (working [facility] 24) admitted with asymptomatic advanced cervical dilation and possible rupture of membranes (LOF since 24 pre pt report). Currently denies symptoms of infection, abruption, or labor. Plan for ongoing close observation as inpatient.     Assessment:  - IUP at 28w4d  - Advanced cervical dilation  - Possible PPROM - + amnisure / negative fern on repeat exam  - Breech presentation  - History of retained placenta in G2 delivery requiring D&C  - GBS neg      Plan:   - S/p Neonatology consultation 24.   - S/p Magnesium for fetal neuro protection. Would plan to restart if concerns develop for labor or moving toward delivery < 32 weeks gestation.   - S/p BMZ -  - Plan to complete 1 hr GCT 7-10 days after BMZ.  - Reasonable to continue Latency antibiotics given equivocal rupture work-up.  - MOD -  delivery unless fetus becomes cephalic.   - VTE PPX - ambulation encouraged / SCD's recommended in bed.     Plan of care discussed with the patient and she is in agreement with this plan. All questions and concerns were answered.     Eva Lee M.D.

## 2024-01-29 NOTE — PROGRESS NOTES
0700- report received from Chacha SAUER. Plan of care discussed.     0805- assessment done. Patient denies any contractions/ cramping, or any VB. Patient state she has very scant LOF. Patient has no abdomen tenderness. States +FM.     1120- Dr Lee updated. Fetal tracing reviewed okay for patient to come off EFM and TOCO

## 2024-01-29 NOTE — CARE PLAN
The patient is Stable - Low risk of patient condition declining or worsening    Shift Goals  Clinical Goals: Monitor for PTL and infection  Patient Goals: Stay pregnant; Rest  Family Goals: Support      Problem: Psychosocial - L&D  Goal: Spiritual and cultural needs incorporated into hospitalization  Outcome: Progressing  Flowsheets (Taken 1/29/2024 2552)  Incorporate Spiritual/Cultural Needs: Encouraged verbilization of feelings, concerns, expectations and needs     Problem: Risk for Infection and Impaired Wound Healing  Goal: Patient's wound/surgical incision will decrease in size and heals properly  Outcome: Progressing     Problem: Risk for Venous Thromboembolism (VTE)  Goal: VTE prevention measures will be implemented and patient will remain free from VTE  Outcome: Progressing

## 2024-01-30 PROCEDURE — 302790 HCHG STAT ANTEPARTUM CARE, DAILY

## 2024-01-30 PROCEDURE — 700102 HCHG RX REV CODE 250 W/ 637 OVERRIDE(OP): Performed by: OBSTETRICS & GYNECOLOGY

## 2024-01-30 PROCEDURE — 59025 FETAL NON-STRESS TEST: CPT

## 2024-01-30 PROCEDURE — A9270 NON-COVERED ITEM OR SERVICE: HCPCS | Performed by: OBSTETRICS & GYNECOLOGY

## 2024-01-30 PROCEDURE — 770002 HCHG ROOM/CARE - OB PRIVATE (112)

## 2024-01-30 RX ADMIN — DOCUSATE SODIUM 100 MG: 100 CAPSULE, LIQUID FILLED ORAL at 18:11

## 2024-01-30 RX ADMIN — DOCUSATE SODIUM 50 MG AND SENNOSIDES 8.6 MG 1 TABLET: 8.6; 5 TABLET, FILM COATED ORAL at 18:11

## 2024-01-30 RX ADMIN — VITAMIN A, VITAMIN C, VITAMIN D, VITAMIN E, THIAMINE, RIBOFLAVIN, NIACIN, VITAMIN B6, FOLIC ACID, VITAMIN B12, CALCIUM, IRON, ZINC, COPPER 1 TABLET: 4000; 120; 400; 22; 1.84; 3; 20; 10; 1; 12; 200; 27; 25; 2 TABLET ORAL at 09:17

## 2024-01-30 RX ADMIN — AMOXICILLIN 250 MG: 250 CAPSULE ORAL at 06:00

## 2024-01-30 NOTE — PROGRESS NOTES
1900: Report received from Eliceo AHN RN; POC discussed.    2000: In to see pt. Pt has no complaints at this time. Confirms good fetal movements; denies uterine activity or vaginal bleeding.    0600: AM meds given. Pt states resting well w/o complaints tonight.

## 2024-01-30 NOTE — PROGRESS NOTES
MFM  PROGRESS NOTE    Primary OB/GYN: PNC with OB in [place]. Current care with [facility] (they are aware and planning to do the delivery when indicated).     SUBJECTIVE  No overnight events. Denies pain. + FM. Denies VB and contractions. +LOF, small and clear in amount.    OBJECTIVE:  Vitals:    24 0732   BP: 111/72   Pulse: 99   Resp: 16   Temp: 36.3 °C (97.4 °F)   SpO2: 96%      PHYSICAL EXAM:  General:   Alert, conversational, pleasant, no acute distress  Lungs:   Normal work of breathing, CTAB.   Heart:   RRR, no mrg.  Abdomen:  Soft, gravid, non-tender, non-distended    OB Ultrasound:  Outpatient ultrasound (24) - [facility], EFW 1061g (26%), posterior placenta without previa. MARV 11.9 cm.      FHT: See separate NST report    Medications:   Current Facility-Administered Medications   Medication Dose Route Frequency Provider Last Rate Last Admin    lidocaine (XYLOCAINE) 1%  injection  20 mL Subcutaneous Once PRN Carter Oliveros M.D.        terbutaline (Brethine) injection 0.25 mg  0.25 mg Subcutaneous Once PRN Carter Oliveros M.D.        oxytocin (Pitocin) infusion (for post delivery)  125 mL/hr Intravenous Continuous Carter Oliveros M.D.   Held at 24 1345    oxytocin (Pitocin) injection 10 Units  10 Units Intramuscular Once PRN Carter Oliveros M.D.        calcium GLUConate injection 1 g  1 g Intravenous Once PRN Carter Oliveros M.D.        ondansetron (Zofran ODT) dispertab 4 mg  4 mg Oral Q6HRS PRN Carter Oliveros M.D.        Or    ondansetron (Zofran) syringe/vial injection 4 mg  4 mg Intravenous Q6HRS PRN Carter Oliveros M.D.        acetaminophen (Tylenol) tablet 650 mg  650 mg Oral Q6HRS PRN Carter Oliveros M.D.   650 mg at 24 1801    calcium carbonate (Tums) chewable tab 500 mg  500 mg Oral 4X/DAY PRN Carter Oliveros M.D.        lactated ringers infusion   Intravenous Continuous Carter Oliveros M.D. 75 mL/hr at 24 1647 New Bag at 24 1647    prenatal plus  vitamin (Stuartnatal 1+1) 27-1 MG tablet 1 Tablet  1 Tablet Oral Daily-0800 Carter Oliveros M.D.   1 Tablet at 24 0830    docusate sodium (Colace) capsule 100 mg  100 mg Oral BID Carter Oliveros M.D.   100 mg at 24 174    senna-docusate (Pericolace Or Senokot S) 8.6-50 MG per tablet 1 Tablet  1 Tablet Oral BID Carter Oliveros M.D.   1 Tablet at 24 174    mag hydrox-al hydrox-simeth (Maalox Plus Es Or Mylanta Ds) suspension 10 mL  10 mL Oral Q6HRS PRN Carter Oliveros M.D.            Labs:   Recent Results (from the past 24 hour(s))   CBC WITH DIFFERENTIAL    Collection Time: 24  8:22 AM   Result Value Ref Range    WBC 15.0 (H) 4.8 - 10.8 K/uL    RBC 3.80 (L) 4.20 - 5.40 M/uL    Hemoglobin 11.8 (L) 12.0 - 16.0 g/dL    Hematocrit 34.6 (L) 37.0 - 47.0 %    MCV 91.1 81.4 - 97.8 fL    MCH 31.1 27.0 - 33.0 pg    MCHC 34.1 32.2 - 35.5 g/dL    RDW 44.2 35.9 - 50.0 fL    Platelet Count 207 164 - 446 K/uL    MPV 11.9 9.0 - 12.9 fL    Neutrophils-Polys 78.10 (H) 44.00 - 72.00 %    Lymphocytes 16.00 (L) 22.00 - 41.00 %    Monocytes 4.30 0.00 - 13.40 %    Eosinophils 0.50 0.00 - 6.90 %    Basophils 0.30 0.00 - 1.80 %    Immature Granulocytes 0.80 0.00 - 0.90 %    Nucleated RBC 0.00 0.00 - 0.20 /100 WBC    Neutrophils (Absolute) 11.71 (H) 1.82 - 7.42 K/uL    Lymphs (Absolute) 2.39 1.00 - 4.80 K/uL    Monos (Absolute) 0.65 0.00 - 0.85 K/uL    Eos (Absolute) 0.07 0.00 - 0.51 K/uL    Baso (Absolute) 0.04 0.00 - 0.12 K/uL    Immature Granulocytes (abs) 0.12 (H) 0.00 - 0.11 K/uL    NRBC (Absolute) 0.00 K/uL         Imaging: No results found.    ASSESSMENT & PLAN  27 yo  at 28w5d (working [facility] 24) admitted with asymptomatic advanced cervical dilation and possible rupture of membranes (LOF since 24 pre pt report). Currently denies symptoms of infection, abruption, or labor. Plan for ongoing close observation as inpatient.     Assessment:  - IUP at 28w5d  - Advanced cervical dilation  - Possible  PPROM - + amnisure / negative fern on repeat exam  - Breech presentation  - History of retained placenta in G2 delivery requiring D&C  - GBS neg    Plan:   - S/p Neonatology consultation 24.   - S/p Magnesium for fetal neuro protection. Would plan to restart if concerns develop for labor or moving toward delivery < 32 weeks gestation.   - S/p BMZ -  - Plan to complete 1 hr GCT 7-10 days after BMZ.  - s/p latency antibiotics   - MOD -  delivery unless fetus becomes cephalic.   - VTE PPX - ambulation encouraged / SCD's recommended in bed.     Plan of care discussed with the patient and she is in agreement with this plan. All questions and concerns were answered.     Eva Lee M.D.

## 2024-01-30 NOTE — NST NOTE
Tiffanie Lujan   Gestational age: 28w5d     Indication: admission with advanced cervical dilation, possible PPROM.     NST Interpretation:  Baseline 140 bpm, moderate variability, + accelerations, no decelerations.   [place] - quiet     Plan: BID NST

## 2024-01-30 NOTE — PROGRESS NOTES
"0700 - Report received from CRISTIANE Vargas. POC discussed. Care assumed.  0900 - [employer] used for translation. Pt denies VB/ UC/ cramping/ pain. Pt reports +FM and leaking a \"little\" amount of clear fluid.  1900 - Report given to CRISTIANE Vargas.  Care relinquished.   "

## 2024-01-30 NOTE — CARE PLAN
The patient is Watcher    Shift Goals  Clinical Goals: monitor for s/s of infection and for s/s of  labor  Patient Goals: stay pregnant, healthy growing fetus  Family Goals: emotional support      Problem: Knowledge Deficit - L&D  Goal: Patient and family/caregivers will demonstrate understanding of plan of care, disease process/condition, diagnostic tests and medications  Outcome: Progressing  POC discussed with pt. Pt verbalizes understanding and asks questions as needed.     Problem: Risk for Infection and Impaired Wound Healing  Goal: Patient will remain free from infection  Outcome: Progressing  Frequent temperature checks as ordered. Assessed for s/s of infection. Monitor and reviewed lab results.  Assess for FHR changes associated with infection and distress d/t PPROM.     Problem: Risk for Injury  Goal: Patient and fetus will be free of preventable injury/complications  Outcome: Progressing   EFM and toco in place for NST monitoring of FHR and uterine activity. This RN will continue to assess pt LOF and s/s of labor. Pt head to toe complete as baseline for any changes in pt condition.

## 2024-01-31 PROCEDURE — 770002 HCHG ROOM/CARE - OB PRIVATE (112)

## 2024-01-31 PROCEDURE — A9270 NON-COVERED ITEM OR SERVICE: HCPCS | Performed by: OBSTETRICS & GYNECOLOGY

## 2024-01-31 PROCEDURE — 59025 FETAL NON-STRESS TEST: CPT

## 2024-01-31 PROCEDURE — 700102 HCHG RX REV CODE 250 W/ 637 OVERRIDE(OP): Performed by: OBSTETRICS & GYNECOLOGY

## 2024-01-31 PROCEDURE — 302790 HCHG STAT ANTEPARTUM CARE, DAILY

## 2024-01-31 RX ADMIN — ACETAMINOPHEN 650 MG: 325 TABLET, FILM COATED ORAL at 15:34

## 2024-01-31 RX ADMIN — DOCUSATE SODIUM 100 MG: 100 CAPSULE, LIQUID FILLED ORAL at 06:15

## 2024-01-31 RX ADMIN — VITAMIN A, VITAMIN C, VITAMIN D, VITAMIN E, THIAMINE, RIBOFLAVIN, NIACIN, VITAMIN B6, FOLIC ACID, VITAMIN B12, CALCIUM, IRON, ZINC, COPPER 1 TABLET: 4000; 120; 400; 22; 1.84; 3; 20; 10; 1; 12; 200; 27; 25; 2 TABLET ORAL at 08:10

## 2024-01-31 RX ADMIN — DOCUSATE SODIUM 50 MG AND SENNOSIDES 8.6 MG 1 TABLET: 8.6; 5 TABLET, FILM COATED ORAL at 17:52

## 2024-01-31 RX ADMIN — DOCUSATE SODIUM 50 MG AND SENNOSIDES 8.6 MG 1 TABLET: 8.6; 5 TABLET, FILM COATED ORAL at 06:15

## 2024-01-31 RX ADMIN — DOCUSATE SODIUM 100 MG: 100 CAPSULE, LIQUID FILLED ORAL at 17:52

## 2024-01-31 NOTE — PROGRESS NOTES
0700- report received from Aggie SAUER. Plan of care discussed.     0800- assessment done. Patient denies any contractions/ cramping or any VB. Patient states she had some mucus discharge last night. States positive fetal movement.     0835- Dr Lee at bedside. Discussed plan of care with patient via . Consent for bilateral tubal if patient has a  section at delivery    1900-report given to Sahil SAUER

## 2024-01-31 NOTE — PROGRESS NOTES
"1900: Report from Na HICKS RN; POC discussed.    1936: In to see pt, no complaints at this time. Pt confirms good fetal activity, denies any vaginal bleeding and uterine activity. Pt did mention that she noticed some mucousy discharge today.     2040: Evening NST complete; monitors removed.    0615: pt called; she states she feels like she needs to have a BM but \" it's not happening.\" Pt did have a BM on Monday. Colace and maryann colace given. Otherwise, pt says she rested well last night.    0700: Report to Eliceo AHN RN; POC discussed      "

## 2024-01-31 NOTE — PROGRESS NOTES
MFM  PROGRESS NOTE    Primary OB/GYN: PNC with OB in [place]. Current care with [facility] (they are aware and planning to do the delivery when indicated).     SUBJECTIVE  No events. Denies pain. + FM. Denies VB and contractions. +LOF, small and clear in amount. Patient says today if she delivers by  she would like tubal ligation. She says she is 100% certain that she does not desire future parity.     # [id number]    OBJECTIVE:  Vitals:    24 0801   BP: 95/65   Pulse: 100   Resp: 16   Temp: 37.1 °C (98.7 °F)   SpO2:       PHYSICAL EXAM:  General:   Alert, conversational, pleasant, no acute distress  Lungs:   Normal work of breathing, CTAB.   Heart:   RRR, no mrg.  Abdomen:  Soft, gravid, non-tender, non-distended    OB Ultrasound:  Outpatient ultrasound (24) - [facility], EFW 1061g (26%), posterior placenta without previa. MARV 11.9 cm.      FHT: See separate NST report    Medications:   Current Facility-Administered Medications   Medication Dose Route Frequency Provider Last Rate Last Admin    lidocaine (XYLOCAINE) 1%  injection  20 mL Subcutaneous Once PRN Carter Oliveros M.D.        terbutaline (Brethine) injection 0.25 mg  0.25 mg Subcutaneous Once PRN Carter Oliveros M.D.        oxytocin (Pitocin) infusion (for post delivery)  125 mL/hr Intravenous Continuous Carter Oliveros M.D.   Held at 24 1345    oxytocin (Pitocin) injection 10 Units  10 Units Intramuscular Once PRN Carter Oliveros M.D.        calcium GLUConate injection 1 g  1 g Intravenous Once PRN Carter Oliveros M.D.        ondansetron (Zofran ODT) dispertab 4 mg  4 mg Oral Q6HRS PRN Carter Oliveros M.D.        Or    ondansetron (Zofran) syringe/vial injection 4 mg  4 mg Intravenous Q6HRS PRN Carter Oliveros M.D.        acetaminophen (Tylenol) tablet 650 mg  650 mg Oral Q6HRS PRN Carter Oliveros M.D.   650 mg at 24 1801    calcium carbonate (Tums) chewable tab 500 mg  500 mg Oral 4X/DAY PRN Carter FOX  JOB Oliveros        lactated ringers infusion   Intravenous Continuous Carter Oliveros M.D. 75 mL/hr at 24 1647 New Bag at 24 1647    prenatal plus vitamin (Stuartnatal 1+1) 27-1 MG tablet 1 Tablet  1 Tablet Oral Daily-0800 Carter Oliveros M.D.   1 Tablet at 24 0810    docusate sodium (Colace) capsule 100 mg  100 mg Oral BID Carter Oliveros M.D.   100 mg at 24 0615    senna-docusate (Pericolace Or Senokot S) 8.6-50 MG per tablet 1 Tablet  1 Tablet Oral BID Carter Oliveros M.D.   1 Tablet at 24 0615    mag hydrox-al hydrox-simeth (Maalox Plus Es Or Mylanta Ds) suspension 10 mL  10 mL Oral Q6HRS PRN Carter Oliveros M.D.            Labs:   No results found for this or any previous visit (from the past 24 hour(s)).    Imaging: No results found.    ASSESSMENT & PLAN  29 yo  at 28w6d (working [facility] 24) admitted with asymptomatic advanced cervical dilation and possible rupture of membranes (LOF since 24 pre pt report). Currently denies symptoms of infection, abruption, or labor. Plan for ongoing close observation as inpatient.     Assessment:  - IUP at 28w6d  - Advanced cervical dilation  - Possible PPROM - + amnisure / negative fern on repeat exam  - Breech presentation  - History of retained placenta in G2 delivery requiring D&C  - GBS neg  - Undesired future fertility    Plan:   - S/p Neonatology consultation 24.   - S/p Magnesium for fetal neuro protection. Would plan to restart if concerns develop for labor or moving toward delivery < 32 weeks gestation.   - S/p BMZ -  - Plan to complete 1 hr GCT 7-10 days after BMZ.  - s/p latency antibiotics   - MOD -  delivery unless fetus becomes cephalic.   - VTE PPX - ambulation encouraged / SCD's recommended in bed.   - Patient desires BTL if delivered by     Plan of care discussed with the patient and she is in agreement with this plan. All questions and concerns were answered.     Eva Lee,  M.D.

## 2024-01-31 NOTE — NST NOTE
Tiffanie Lujan   Gestational age: 28w6d     Indication: admission with advanced cervical dilation, possible PPROM.     NST Interpretation:  Baseline 140 bpm, moderate variability, + accelerations, no decelerations.   [place] - rare contractions    Plan: BID NST

## 2024-01-31 NOTE — CARE PLAN
The patient is Stable - Low risk of patient condition declining or worsening    Shift Goals  Clinical Goals: monitor for s/s of infection  Patient Goals: stay pregnant  Family Goals: emotional support    Progress made toward(s) clinical / shift goals:  patient denies any contractions/ Cramping. Patient has no abdomen tenderness or S&S of infections    Patient is not progressing towards the following goals:n/a

## 2024-02-01 LAB
ABO GROUP BLD: NORMAL
BASOPHILS # BLD AUTO: 0.3 % (ref 0–1.8)
BASOPHILS # BLD: 0.04 K/UL (ref 0–0.12)
BLD GP AB SCN SERPL QL: NORMAL
EOSINOPHIL # BLD AUTO: 0.07 K/UL (ref 0–0.51)
EOSINOPHIL NFR BLD: 0.5 % (ref 0–6.9)
ERYTHROCYTE [DISTWIDTH] IN BLOOD BY AUTOMATED COUNT: 43.8 FL (ref 35.9–50)
HCT VFR BLD AUTO: 33.7 % (ref 37–47)
HGB BLD-MCNC: 11.8 G/DL (ref 12–16)
IMM GRANULOCYTES # BLD AUTO: 0.07 K/UL (ref 0–0.11)
IMM GRANULOCYTES NFR BLD AUTO: 0.5 % (ref 0–0.9)
LYMPHOCYTES # BLD AUTO: 2.71 K/UL (ref 1–4.8)
LYMPHOCYTES NFR BLD: 20.1 % (ref 22–41)
MCH RBC QN AUTO: 31.4 PG (ref 27–33)
MCHC RBC AUTO-ENTMCNC: 35 G/DL (ref 32.2–35.5)
MCV RBC AUTO: 89.6 FL (ref 81.4–97.8)
MONOCYTES # BLD AUTO: 0.73 K/UL (ref 0–0.85)
MONOCYTES NFR BLD AUTO: 5.4 % (ref 0–13.4)
NEUTROPHILS # BLD AUTO: 9.83 K/UL (ref 1.82–7.42)
NEUTROPHILS NFR BLD: 73.2 % (ref 44–72)
NRBC # BLD AUTO: 0 K/UL
NRBC BLD-RTO: 0 /100 WBC (ref 0–0.2)
PLATELET # BLD AUTO: 211 K/UL (ref 164–446)
PMV BLD AUTO: 11.6 FL (ref 9–12.9)
RBC # BLD AUTO: 3.76 M/UL (ref 4.2–5.4)
RH BLD: NORMAL
WBC # BLD AUTO: 13.5 K/UL (ref 4.8–10.8)

## 2024-02-01 PROCEDURE — 86901 BLOOD TYPING SEROLOGIC RH(D): CPT

## 2024-02-01 PROCEDURE — 59025 FETAL NON-STRESS TEST: CPT

## 2024-02-01 PROCEDURE — 700102 HCHG RX REV CODE 250 W/ 637 OVERRIDE(OP): Performed by: OBSTETRICS & GYNECOLOGY

## 2024-02-01 PROCEDURE — 85025 COMPLETE CBC W/AUTO DIFF WBC: CPT

## 2024-02-01 PROCEDURE — 86850 RBC ANTIBODY SCREEN: CPT

## 2024-02-01 PROCEDURE — 302790 HCHG STAT ANTEPARTUM CARE, DAILY

## 2024-02-01 PROCEDURE — 770002 HCHG ROOM/CARE - OB PRIVATE (112)

## 2024-02-01 PROCEDURE — 36415 COLL VENOUS BLD VENIPUNCTURE: CPT

## 2024-02-01 PROCEDURE — 86900 BLOOD TYPING SEROLOGIC ABO: CPT

## 2024-02-01 PROCEDURE — A9270 NON-COVERED ITEM OR SERVICE: HCPCS | Performed by: OBSTETRICS & GYNECOLOGY

## 2024-02-01 RX ADMIN — DOCUSATE SODIUM 50 MG AND SENNOSIDES 8.6 MG 1 TABLET: 8.6; 5 TABLET, FILM COATED ORAL at 05:52

## 2024-02-01 RX ADMIN — DOCUSATE SODIUM 100 MG: 100 CAPSULE, LIQUID FILLED ORAL at 18:34

## 2024-02-01 RX ADMIN — VITAMIN A, VITAMIN C, VITAMIN D, VITAMIN E, THIAMINE, RIBOFLAVIN, NIACIN, VITAMIN B6, FOLIC ACID, VITAMIN B12, CALCIUM, IRON, ZINC, COPPER 1 TABLET: 4000; 120; 400; 22; 1.84; 3; 20; 10; 1; 12; 200; 27; 25; 2 TABLET ORAL at 07:48

## 2024-02-01 RX ADMIN — DOCUSATE SODIUM 50 MG AND SENNOSIDES 8.6 MG 1 TABLET: 8.6; 5 TABLET, FILM COATED ORAL at 18:34

## 2024-02-01 RX ADMIN — DOCUSATE SODIUM 100 MG: 100 CAPSULE, LIQUID FILLED ORAL at 05:52

## 2024-02-01 ASSESSMENT — PATIENT HEALTH QUESTIONNAIRE - PHQ9
SUM OF ALL RESPONSES TO PHQ9 QUESTIONS 1 AND 2: 0
2. FEELING DOWN, DEPRESSED, IRRITABLE, OR HOPELESS: NOT AT ALL
1. LITTLE INTEREST OR PLEASURE IN DOING THINGS: NOT AT ALL

## 2024-02-01 NOTE — NST NOTE
Overnight NST reviewed 1/31/23 2000    Tiffanie Lujan   Gestational age: 29w0d     Indication: advanced cervical dilation    NST Interpretation:  Baseline 150 bpm / moderate variability / + accelerations / no decelerations.     [place] : quiet    Plan: BID NST    Immanuel Mota MD

## 2024-02-01 NOTE — CARE PLAN
The patient is Watcher - Medium risk of patient condition declining or worsening    Shift Goals  Clinical Goals: Monitor for s/s of infection  Patient Goals: Stay pregnant, rest  Family Goals: Support    Progress made toward(s) clinical / shift goals:  Pt v/s remain stable with no s/s of infection. Pt consented for future bilateral salpingectomy with MD, pt expresses understanding and is aware that she can always change her mind.

## 2024-02-01 NOTE — PROGRESS NOTES
0700 Report received from Sahil SAUER, POC discussed and care assumed. Monitors applied x2, pt resting comfortably in bed, no requests at this time.    1900 Report given to James SAUER, POC discussed and care relinquished.

## 2024-02-01 NOTE — PROGRESS NOTES
- report received from CRISTIANE Fenton. ,  admitted for PPROM. Pt currently comfortable in bed, no needs. Discussed POC.  - pt called this RN to bedside to show mucus looking discharge on toilet paper after using bathroom.  No blood tinged fluid. No concerns at this time, reassured pt this was normal.  700- report given to CRISTIANE Rivers. Pt in stable condition, care relinquished.

## 2024-02-02 PROCEDURE — 700102 HCHG RX REV CODE 250 W/ 637 OVERRIDE(OP): Performed by: OBSTETRICS & GYNECOLOGY

## 2024-02-02 PROCEDURE — 302790 HCHG STAT ANTEPARTUM CARE, DAILY

## 2024-02-02 PROCEDURE — 770002 HCHG ROOM/CARE - OB PRIVATE (112)

## 2024-02-02 PROCEDURE — A9270 NON-COVERED ITEM OR SERVICE: HCPCS | Performed by: OBSTETRICS & GYNECOLOGY

## 2024-02-02 PROCEDURE — 59025 FETAL NON-STRESS TEST: CPT

## 2024-02-02 RX ADMIN — VITAMIN A, VITAMIN C, VITAMIN D, VITAMIN E, THIAMINE, RIBOFLAVIN, NIACIN, VITAMIN B6, FOLIC ACID, VITAMIN B12, CALCIUM, IRON, ZINC, COPPER 1 TABLET: 4000; 120; 400; 22; 1.84; 3; 20; 10; 1; 12; 200; 27; 25; 2 TABLET ORAL at 08:22

## 2024-02-02 RX ADMIN — DOCUSATE SODIUM 100 MG: 100 CAPSULE, LIQUID FILLED ORAL at 06:04

## 2024-02-02 RX ADMIN — DOCUSATE SODIUM 50 MG AND SENNOSIDES 8.6 MG 1 TABLET: 8.6; 5 TABLET, FILM COATED ORAL at 06:04

## 2024-02-02 ASSESSMENT — PATIENT HEALTH QUESTIONNAIRE - PHQ9
2. FEELING DOWN, DEPRESSED, IRRITABLE, OR HOPELESS: NOT AT ALL
1. LITTLE INTEREST OR PLEASURE IN DOING THINGS: NOT AT ALL
SUM OF ALL RESPONSES TO PHQ9 QUESTIONS 1 AND 2: 0

## 2024-02-02 NOTE — NST NOTE
NST overnight reviewed (2/1/24 20:00)     Tiffanie Lujan   Gestational age: 29w1d     Indication: Cervical dilation and possible PPROM    NST Interpretation:  Baseline 130 bpm / moderate variability / + accelerations / no decelerations.     [place]: quiet    Plan - NST BID     Immanuel Mota MD

## 2024-02-02 NOTE — PROGRESS NOTES
MFM  PROGRESS NOTE    Primary OB/GYN: PNC with OB in [place]. Current care with [facility] (they are aware and planning to do the delivery when indicated).     SUBJECTIVE  No events overnight. Denies pain. + FM. Denies VB and contractions. +LOF, reports small amount, somewhat yellow to clear in color similar to yesterday. Denies irritation or odor.     OBJECTIVE:  Vitals:    24 0540   BP: 101/60   Pulse: 98   Resp: 16   Temp: 36.2 °C (97.2 °F)   SpO2:       PHYSICAL EXAM:  General:   Alert, conversational, pleasant, no acute distress  Lungs:   Normal work of breathing  Abdomen:  Soft, gravid, non-tender, non-distended. No fundal tenderness.     OB Ultrasound:  Outpatient ultrasound (24) - [facility]REE[facility], EFW 1061g (26%), posterior placenta without previa. MARV 11.9 cm.      FHT: See separate NST report    Medications:   Current Facility-Administered Medications   Medication Dose Route Frequency Provider Last Rate Last Admin    lidocaine (XYLOCAINE) 1%  injection  20 mL Subcutaneous Once PRN Carter Oliveros M.D.        terbutaline (Brethine) injection 0.25 mg  0.25 mg Subcutaneous Once PRN Carter Oliveros M.D.        oxytocin (Pitocin) infusion (for post delivery)  125 mL/hr Intravenous Continuous Carter Oliveros M.D.   Held at 24 1345    oxytocin (Pitocin) injection 10 Units  10 Units Intramuscular Once PRN Carter Oliveros M.D.        calcium GLUConate injection 1 g  1 g Intravenous Once PRN Carter Oliveros M.D.        ondansetron (Zofran ODT) dispertab 4 mg  4 mg Oral Q6HRS PRN Carter Oliveros M.D.        Or    ondansetron (Zofran) syringe/vial injection 4 mg  4 mg Intravenous Q6HRS PRN Carter Oliveros M.D.        acetaminophen (Tylenol) tablet 650 mg  650 mg Oral Q6HRS PRN Carter Oliveros M.D.   650 mg at 24 1534    calcium carbonate (Tums) chewable tab 500 mg  500 mg Oral 4X/DAY PRN Carter Oliveros M.D.        lactated ringers infusion   Intravenous Continuous Carter Oliveros  M.D. 75 mL/hr at 24 1647 New Bag at 24 1647    prenatal plus vitamin (Stuartnatal 1+1) 27-1 MG tablet 1 Tablet  1 Tablet Oral Daily-0800 Carter Oliveros M.D.   1 Tablet at 24 0822    docusate sodium (Colace) capsule 100 mg  100 mg Oral BID Carter Oliveros M.D.   100 mg at 24 0604    senna-docusate (Pericolace Or Senokot S) 8.6-50 MG per tablet 1 Tablet  1 Tablet Oral BID Carter Oliveros M.D.   1 Tablet at 24 0604    mag hydrox-al hydrox-simeth (Maalox Plus Es Or Mylanta Ds) suspension 10 mL  10 mL Oral Q6HRS PRN Carter Oliveros M.D.            Labs:   No results found for this or any previous visit (from the past 24 hour(s)).      Imaging: No results found.    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 Currently denies symptoms of infection, abruption, or labor. Plan for ongoing close observation as inpatient.     Assessment:  - IUP at 29w1d  - Advanced cervical dilation  - Possible PPROM - + amnisure / negative fern on repeat exam  - Breech presentation  - History of retained placenta in G2 delivery requiring D&C  - GBS neg  - Undesired future fertility    Plan:   - S/p Neonatology consultation 24.   - S/p Magnesium for fetal neuro protection. Would plan to restart if concerns develop for labor or moving toward delivery < 32 weeks gestation.   - S/p BMZ -  - Plan to complete 1 hr GCT 7-10 days after BMZ.  - s/p latency antibiotics   - MOD -  delivery unless fetus becomes cephalic.   - VTE PPX - ambulation encouraged / SCD's recommended in bed.   - Patient desires BTL if delivered by     Plan of care discussed with the patient and she is in agreement with this plan. All questions and concerns were answered.     Immanuel Mota MD

## 2024-02-02 NOTE — CARE PLAN
The patient is Stable - Low risk of patient condition declining or worsening    Shift Goals  Clinical Goals: monitor for s/s of infection, check temperature, reassuring FHR  Patient Goals: stay pregnant, education  Family Goals: support, rest    Progress made toward(s) clinical / shift goals:    Problem: Knowledge Deficit - L&D  Goal: Patient and family/caregivers will demonstrate understanding of plan of care, disease process/condition, diagnostic tests and medications  Outcome: Progressing     Problem: Pain  Goal: Patient's pain will be alleviated or reduced to the patient’s comfort goal  Outcome: Progressing     Problem: Risk for Injury  Goal: Patient and fetus will be free of preventable injury/complications  Outcome: Progressing       Patient is not progressing towards the following goals:

## 2024-02-02 NOTE — PROGRESS NOTES
1900: Received report from Tita SAUER    1919: EFM and TOCO applied. Pt denies LOF, no ctx, no VB, + FM.     2040: NST complete    0700: Gave report to Chapis SAUER.

## 2024-02-02 NOTE — PROGRESS NOTES
0700 Report received from James SAUER.   0830 Patient denies contractions but complains of small but continual LOF and mild yellow-clear coloration to discharge with slight odor. Dr. Mota at bedside. Patient has remained afebrile. No new orders at this time. Patient encouraged to take pictures of pads and to notify RN for assessment. All patient questions answered.  1020 Patient has reported a change of decreased FM today, Dr. Mota notified. Orders received for continuous monitoring at this time.  1150 Patient feels active FM. MD's reviewed FHT. Orders received to discontinue continuous fetal monitoring.  1400 Report given to Kassy SAUER

## 2024-02-02 NOTE — CARE PLAN
The patient is Watcher - Medium risk of patient condition declining or worsening    Shift Goals  Clinical Goals: Monitor for s/s of infection  Patient Goals: Remain pregnant  Family Goals: Support    Progress made toward(s) clinical / shift goals:    Problem: Pain  Goal: Patient's pain will be alleviated or reduced to the patient’s comfort goal  Outcome: Progressing     Problem: Risk for Infection and Impaired Wound Healing  Goal: Patient's wound/surgical incision will decrease in size and heals properly  Outcome: Progressing  Goal: Patient will remain free from infection  Outcome: Progressing

## 2024-02-03 PROCEDURE — 700102 HCHG RX REV CODE 250 W/ 637 OVERRIDE(OP): Performed by: OBSTETRICS & GYNECOLOGY

## 2024-02-03 PROCEDURE — 302790 HCHG STAT ANTEPARTUM CARE, DAILY

## 2024-02-03 PROCEDURE — 90686 IIV4 VACC NO PRSV 0.5 ML IM: CPT | Performed by: STUDENT IN AN ORGANIZED HEALTH CARE EDUCATION/TRAINING PROGRAM

## 2024-02-03 PROCEDURE — 700111 HCHG RX REV CODE 636 W/ 250 OVERRIDE (IP): Performed by: STUDENT IN AN ORGANIZED HEALTH CARE EDUCATION/TRAINING PROGRAM

## 2024-02-03 PROCEDURE — 59025 FETAL NON-STRESS TEST: CPT

## 2024-02-03 PROCEDURE — 3E02340 INTRODUCTION OF INFLUENZA VACCINE INTO MUSCLE, PERCUTANEOUS APPROACH: ICD-10-PCS | Performed by: OBSTETRICS & GYNECOLOGY

## 2024-02-03 PROCEDURE — 770002 HCHG ROOM/CARE - OB PRIVATE (112)

## 2024-02-03 PROCEDURE — 90471 IMMUNIZATION ADMIN: CPT

## 2024-02-03 PROCEDURE — A9270 NON-COVERED ITEM OR SERVICE: HCPCS | Performed by: OBSTETRICS & GYNECOLOGY

## 2024-02-03 RX ADMIN — INFLUENZA A VIRUS A/VICTORIA/4897/2022 IVR-238 (H1N1) ANTIGEN (FORMALDEHYDE INACTIVATED), INFLUENZA A VIRUS A/DARWIN/9/2021 SAN-010 (H3N2) ANTIGEN (FORMALDEHYDE INACTIVATED), INFLUENZA B VIRUS B/PHUKET/3073/2013 ANTIGEN (FORMALDEHYDE INACTIVATED), AND INFLUENZA B VIRUS B/MICHIGAN/01/2021 ANTIGEN (FORMALDEHYDE INACTIVATED) 0.5 ML: 15; 15; 15; 15 INJECTION, SUSPENSION INTRAMUSCULAR at 20:31

## 2024-02-03 RX ADMIN — DOCUSATE SODIUM 50 MG AND SENNOSIDES 8.6 MG 1 TABLET: 8.6; 5 TABLET, FILM COATED ORAL at 17:55

## 2024-02-03 RX ADMIN — VITAMIN A, VITAMIN C, VITAMIN D, VITAMIN E, THIAMINE, RIBOFLAVIN, NIACIN, VITAMIN B6, FOLIC ACID, VITAMIN B12, CALCIUM, IRON, ZINC, COPPER 1 TABLET: 4000; 120; 400; 22; 1.84; 3; 20; 10; 1; 12; 200; 27; 25; 2 TABLET ORAL at 09:15

## 2024-02-03 RX ADMIN — DOCUSATE SODIUM 100 MG: 100 CAPSULE, LIQUID FILLED ORAL at 17:55

## 2024-02-03 NOTE — NST NOTE
EFM Note (AM monitoring reviewed)    Tiffanie Lujan   Gestational age: 29w2d     Indication: admitted with advanced cervical dilation and concern for possible PPROM.    NST Interpretation:  Baseline 140 bpm, moderate variability, + accelerations, no decelerations.     [place]: quiet     Plan: EFM BID    Immanuel Mota MD

## 2024-02-03 NOTE — PROGRESS NOTES
1400 Report received from Chapis KISER RN at bedside and assumed care. POC discussed with pt and s/o and encouraged to state needs or questions at any time.    1900 Report given to James ENAMORADO RN, care relinquished.

## 2024-02-03 NOTE — PROGRESS NOTES
1900: Received report from Kassy SAUER.     1910: RN in room to talk to pt about POC for the night. Pt has no questions at this time. Pt denies ctx, no VB, complains of yellow-clear discharge, + FM. Pt verbalizes she will call out when discharge has an odor, causes irritation, or is more discolored. Pt verbalizes to also call out when she feels less FM    1925: EFM and TOCO applied    2112: EFM and TOCO discontinued. NST completed.     0700: Gave report to Barbie SAUER.

## 2024-02-03 NOTE — CARE PLAN
The patient is Stable - Low risk of patient condition declining or worsening    Shift Goals  Clinical Goals: Monitor for s/s of infection  Patient Goals: Remain pregnant  Family Goals: Support    Progress made toward(s) clinical / shift goals:    Problem: Knowledge Deficit - L&D  Goal: Patient and family/caregivers will demonstrate understanding of plan of care, disease process/condition, diagnostic tests and medications  Outcome: Progressing     Problem: Pain  Goal: Patient's pain will be alleviated or reduced to the patient’s comfort goal  Outcome: Progressing     Problem: Risk for Injury  Goal: Patient and fetus will be free of preventable injury/complications  Outcome: Progressing       Patient is not progressing towards the following goals:

## 2024-02-03 NOTE — CARE PLAN
The patient is Watcher - Medium risk of patient condition declining or worsening    Shift Goals  Clinical Goals: reassuring FHR, monitor for signs of infection  Patient Goals: remain pregnant  Family Goals: support    Progress made toward(s) clinical / shift goals:    Problem: Knowledge Deficit - L&D  Goal: Patient and family/caregivers will demonstrate understanding of plan of care, disease process/condition, diagnostic tests and medications  Outcome: Progressing   -POC discussed with pt and family. All questions answered.     Problem: Risk for Infection and Impaired Wound Healing  Goal: Patient will remain free from infection  Outcome: Progressing   -Pt afebrile. No s/s of infection noted.     Patient is not progressing towards the following goals:

## 2024-02-03 NOTE — PROGRESS NOTES
0700: Report received from Jmaes ENAMORADO RN. POC discussed.     0725: Received glucose solution from lab for 1 hour glucose test. Pt recently completed breakfast. Dr. Shields informed and MD ordering for test to be performed tomorrow am.     0740: Assessment done. Pt denies VB or UCs. Reports occasional LOF. Pt reporting + FM. Discussed POC with pt and family. Encouraged pt to call with needs.     0744: External monitors applied. Reactive NST obtained.     0914: External monitors removed.     0931: Dr. Mota at bedside. U/S performed by MD. Orders clarified. POC discussed with pt and family. MD reviewed tracing.     1900: Report given to Halima WHEAT RN. POC discussed.

## 2024-02-03 NOTE — PROGRESS NOTES
MFM  PROGRESS NOTE    Primary OB/GYN: PNC with OB in [place]. Current care with [facility] (they are aware and planning to do the delivery when indicated).     SUBJECTIVE  No events overnight. Denies pain. + FM. Denies VB, contractions, or pelvic pressure. Reports ongoing discharge that is yellow /cream in color, stable over the last few days. Denies irritation or odor.     OBJECTIVE:  Vitals:    24 0717   BP: 117/72   Pulse: (!) 102   Resp: 17   Temp: 36.3 °C (97.3 °F)   SpO2:       PHYSICAL EXAM:  General:   Alert, conversational, pleasant, no acute distress  Lungs:   Normal work of breathing  Abdomen:  Soft, gravid, non-tender, non-distended. No fundal tenderness.     OB Ultrasound:  Outpatient ultrasound (24) - [facility], EFW 1061g (26%), posterior placenta without previa. MARV 11.9 cm.     Bedside ultrasound (2/3) - Breech presentation, MARV 10.8 cm     FHT: See separate NST report    Medications:   Current Facility-Administered Medications   Medication Dose Route Frequency Provider Last Rate Last Admin    lidocaine (XYLOCAINE) 1%  injection  20 mL Subcutaneous Once PRN Carter Oliveros M.D.        terbutaline (Brethine) injection 0.25 mg  0.25 mg Subcutaneous Once PRN Carter Oliveros M.D.        oxytocin (Pitocin) infusion (for post delivery)  125 mL/hr Intravenous Continuous Carter Oliveros M.D.   Held at 24 1345    oxytocin (Pitocin) injection 10 Units  10 Units Intramuscular Once PRN Carter Oliveros M.D.        calcium GLUConate injection 1 g  1 g Intravenous Once PRN Carter Oliveros M.D.        ondansetron (Zofran ODT) dispertab 4 mg  4 mg Oral Q6HRS PRN Carter Oliveros M.D.        Or    ondansetron (Zofran) syringe/vial injection 4 mg  4 mg Intravenous Q6HRS PRN Carter Oliveros M.D.        acetaminophen (Tylenol) tablet 650 mg  650 mg Oral Q6HRS PRN Carter Oliveros M.D.   650 mg at 24 1534    calcium carbonate (Tums) chewable tab 500 mg  500 mg Oral 4X/DAY PRN Carter FOX  JOB Oliveros        lactated ringers infusion   Intravenous Continuous Carter Oliveros M.D. 75 mL/hr at 24 1647 New Bag at 24 1647    prenatal plus vitamin (Stuartnatal 1+1) 27-1 MG tablet 1 Tablet  1 Tablet Oral Daily-0800 Carter Oliveros M.D.   1 Tablet at 24 0915    docusate sodium (Colace) capsule 100 mg  100 mg Oral BID Carter Oliveros M.D.   100 mg at 24 0604    senna-docusate (Pericolace Or Senokot S) 8.6-50 MG per tablet 1 Tablet  1 Tablet Oral BID Carter Oliveros M.D.   1 Tablet at 24 0604    mag hydrox-al hydrox-simeth (Maalox Plus Es Or Mylanta Ds) suspension 10 mL  10 mL Oral Q6HRS PRN Carter Oliveros M.D.            Labs:   No results found for this or any previous visit (from the past 24 hour(s)).      Imaging: No results found.    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 Currently denies symptoms of infection, abruption, or labor. Plan for ongoing close observation as inpatient. We reviewed the risk of cord prolapse in the setting of breech presentation and cervical dilation. It has been emphasized to the patient that she needs to alert staff immediately with any changes in symptoms or concern regarding fetal movement. The patient expresses understanding.     Assessment:  - IUP at 29w2d  - Advanced cervical dilation  - Possible PPROM - + amnisure / negative fern on repeat exam. MARV has continued to be within normal limits on today's ultrasound, indicating that the amniotic membrane is likely intact.  - Breech presentation  - History of retained placenta in G2 delivery requiring D&C  - GBS neg  - Undesired future fertility    Plan:   - S/p Neonatology consultation 24.   - S/p Magnesium for fetal neuro protection. Would plan to restart if concerns develop for labor or moving toward delivery < 32 weeks gestation.   - S/p BMZ -  - Planning to completed 1 hr GCT tomorrow.   - s/p latency  antibiotics   - FM - Continued EFM BID. Reviewed with the patient that the fetal status is unknown during time spent off the monitor and I encouraged the patient to alert staff with any concerns regarding decreased fetal movement or changes in symptoms as increased fetal monitoring would be indicated.  - MOD -  delivery given breech presentation  - VTE PPX - ambulation encouraged / SCD's recommended in bed.   - Patient desires BTL if delivered by     Plan of care discussed with the patient and she is in agreement with this plan. All questions and concerns were answered.     Immanuel Mota MD

## 2024-02-04 LAB
BASOPHILS # BLD AUTO: 0.1 % (ref 0–1.8)
BASOPHILS # BLD: 0.02 K/UL (ref 0–0.12)
EOSINOPHIL # BLD AUTO: 0.05 K/UL (ref 0–0.51)
EOSINOPHIL NFR BLD: 0.3 % (ref 0–6.9)
ERYTHROCYTE [DISTWIDTH] IN BLOOD BY AUTOMATED COUNT: 43.9 FL (ref 35.9–50)
GLUCOSE 1H P 50 G GLC PO SERPL-MCNC: 184 MG/DL (ref 70–139)
HCT VFR BLD AUTO: 33.9 % (ref 37–47)
HGB BLD-MCNC: 11.7 G/DL (ref 12–16)
IMM GRANULOCYTES # BLD AUTO: 0.07 K/UL (ref 0–0.11)
IMM GRANULOCYTES NFR BLD AUTO: 0.5 % (ref 0–0.9)
LYMPHOCYTES # BLD AUTO: 1.88 K/UL (ref 1–4.8)
LYMPHOCYTES NFR BLD: 12.6 % (ref 22–41)
MCH RBC QN AUTO: 30.9 PG (ref 27–33)
MCHC RBC AUTO-ENTMCNC: 34.5 G/DL (ref 32.2–35.5)
MCV RBC AUTO: 89.4 FL (ref 81.4–97.8)
MONOCYTES # BLD AUTO: 0.48 K/UL (ref 0–0.85)
MONOCYTES NFR BLD AUTO: 3.2 % (ref 0–13.4)
NEUTROPHILS # BLD AUTO: 12.47 K/UL (ref 1.82–7.42)
NEUTROPHILS NFR BLD: 83.3 % (ref 44–72)
NRBC # BLD AUTO: 0 K/UL
NRBC BLD-RTO: 0 /100 WBC (ref 0–0.2)
PLATELET # BLD AUTO: 210 K/UL (ref 164–446)
PMV BLD AUTO: 11.4 FL (ref 9–12.9)
RBC # BLD AUTO: 3.79 M/UL (ref 4.2–5.4)
WBC # BLD AUTO: 15 K/UL (ref 4.8–10.8)

## 2024-02-04 PROCEDURE — 700102 HCHG RX REV CODE 250 W/ 637 OVERRIDE(OP): Performed by: OBSTETRICS & GYNECOLOGY

## 2024-02-04 PROCEDURE — 82952 GTT-ADDED SAMPLES: CPT

## 2024-02-04 PROCEDURE — 36415 COLL VENOUS BLD VENIPUNCTURE: CPT

## 2024-02-04 PROCEDURE — 82950 GLUCOSE TEST: CPT

## 2024-02-04 PROCEDURE — 82951 GLUCOSE TOLERANCE TEST (GTT): CPT

## 2024-02-04 PROCEDURE — A9270 NON-COVERED ITEM OR SERVICE: HCPCS | Performed by: STUDENT IN AN ORGANIZED HEALTH CARE EDUCATION/TRAINING PROGRAM

## 2024-02-04 PROCEDURE — 85025 COMPLETE CBC W/AUTO DIFF WBC: CPT

## 2024-02-04 PROCEDURE — 700102 HCHG RX REV CODE 250 W/ 637 OVERRIDE(OP): Performed by: STUDENT IN AN ORGANIZED HEALTH CARE EDUCATION/TRAINING PROGRAM

## 2024-02-04 PROCEDURE — 302790 HCHG STAT ANTEPARTUM CARE, DAILY

## 2024-02-04 PROCEDURE — 770002 HCHG ROOM/CARE - OB PRIVATE (112)

## 2024-02-04 PROCEDURE — A9270 NON-COVERED ITEM OR SERVICE: HCPCS | Performed by: OBSTETRICS & GYNECOLOGY

## 2024-02-04 PROCEDURE — 59025 FETAL NON-STRESS TEST: CPT

## 2024-02-04 RX ORDER — POLYETHYLENE GLYCOL 3350 17 G/17G
1 POWDER, FOR SOLUTION ORAL
Status: DISCONTINUED | OUTPATIENT
Start: 2024-02-04 | End: 2024-02-09 | Stop reason: HOSPADM

## 2024-02-04 RX ADMIN — POLYETHYLENE GLYCOL 3350 1 PACKET: 17 POWDER, FOR SOLUTION ORAL at 12:52

## 2024-02-04 RX ADMIN — DOCUSATE SODIUM 100 MG: 100 CAPSULE, LIQUID FILLED ORAL at 17:29

## 2024-02-04 RX ADMIN — DOCUSATE SODIUM 50 MG AND SENNOSIDES 8.6 MG 1 TABLET: 8.6; 5 TABLET, FILM COATED ORAL at 07:23

## 2024-02-04 RX ADMIN — DOCUSATE SODIUM 50 MG AND SENNOSIDES 8.6 MG 1 TABLET: 8.6; 5 TABLET, FILM COATED ORAL at 17:29

## 2024-02-04 RX ADMIN — DOCUSATE SODIUM 100 MG: 100 CAPSULE, LIQUID FILLED ORAL at 07:23

## 2024-02-04 RX ADMIN — VITAMIN A, VITAMIN C, VITAMIN D, VITAMIN E, THIAMINE, RIBOFLAVIN, NIACIN, VITAMIN B6, FOLIC ACID, VITAMIN B12, CALCIUM, IRON, ZINC, COPPER 1 TABLET: 4000; 120; 400; 22; 1.84; 3; 20; 10; 1; 12; 200; 27; 25; 2 TABLET ORAL at 07:23

## 2024-02-04 NOTE — CARE PLAN
The patient is Watcher - Medium risk of patient condition declining or worsening    Shift Goals  Clinical Goals: Establish fetal wellbing  Patient Goals: remain pregnant  Family Goals: support    Progress made toward(s) clinical / shift goals:  NST complete, appropriate for gestation.    Patient is not progressing towards the following goals: n/a    Problem: Knowledge Deficit - L&D  Goal: Patient and family/caregivers will demonstrate understanding of plan of care, disease process/condition, diagnostic tests and medications  Outcome: Progressing   Patient expresses understanding of plan of care.  Problem: Risk for Infection and Impaired Wound Healing  Goal: Patient's wound/surgical incision will decrease in size and heals properly  Outcome: Progressing   Afebrile.

## 2024-02-04 NOTE — PROGRESS NOTES
1900 Report received from Barbie DAHL RN.    Patient reports normal fetal movement, denies contractions or vaginal bleeding. Reports no changes to leakage of fluid, per patient continuing to leak scant amount of fluid. Flu shot given this evening, See MAR. Patient education handout for vaccine provided in Amharic. Patient declined needing  this evening.    2016 EFM applied.    2138 EFM removed, NST complete and reactive.    0604 Confirmed patient fasting status, patient drank glucose test drink as directed. Lab to draw blood glucose in 1 hr.    0700 Report given to CRISTIANE Thomas.    0706 Phlebotomist called to collect labs.

## 2024-02-04 NOTE — PROGRESS NOTES
MFM  PROGRESS NOTE    Primary OB/GYN: PNC with OB in [place]. Current care with [facility] (they are aware and planning to do the delivery when indicated).     SUBJECTIVE  No events overnight. Denies pain. + FM. Denies VB, contractions, or pelvic pressure. Reports ongoing discharge that is yellow /cream in color, stable over the last few days. Denies irritation or odor. Reports normal urinary function. Last bowel movement 2 days ago (on colace/senna)    OBJECTIVE:  Vitals:    24 0716   BP: 115/73   Pulse: (!) 102   Resp: 16   Temp: 36.4 °C (97.6 °F)   SpO2:       PHYSICAL EXAM:  General:   Alert, conversational, pleasant, no acute distress  Lungs:   Normal work of breathing  Abdomen:  Soft, gravid, non-tender, non-distended. No fundal tenderness.     OB Ultrasound:  Outpatient ultrasound (24) - [facility], EFW 1061g (26%), posterior placenta without previa. MARV 11.9 cm.     Bedside ultrasound (2/3) - Breech presentation, MARV 10.8 cm     FHT: See separate NST report    Medications:   Current Facility-Administered Medications   Medication Dose Route Frequency Provider Last Rate Last Admin    lidocaine (XYLOCAINE) 1%  injection  20 mL Subcutaneous Once PRN Carter Oliveros M.D.        terbutaline (Brethine) injection 0.25 mg  0.25 mg Subcutaneous Once PRN Carter Oliveros M.D.        oxytocin (Pitocin) infusion (for post delivery)  125 mL/hr Intravenous Continuous Carter Oliveros M.D.   Held at 24 1345    oxytocin (Pitocin) injection 10 Units  10 Units Intramuscular Once PRN Carter Oliveros M.D.        calcium GLUConate injection 1 g  1 g Intravenous Once PRN Carter Oliveros M.D.        ondansetron (Zofran ODT) dispertab 4 mg  4 mg Oral Q6HRS PRN Carter Oliveros M.D.        Or    ondansetron (Zofran) syringe/vial injection 4 mg  4 mg Intravenous Q6HRS PRN Carter Oliveros M.D.        acetaminophen (Tylenol) tablet 650 mg  650 mg Oral Q6HRS PRN aCrter Oliveros M.D.   650 mg at 24 6404     calcium carbonate (Tums) chewable tab 500 mg  500 mg Oral 4X/DAY PRN Carter Oliveros M.D.        lactated ringers infusion   Intravenous Continuous Carter Oliveros M.D. 75 mL/hr at 01/24/24 1647 New Bag at 01/24/24 1647    prenatal plus vitamin (Stuartnatal 1+1) 27-1 MG tablet 1 Tablet  1 Tablet Oral Daily-0800 Carter Oliveros M.D.   1 Tablet at 02/04/24 0723    docusate sodium (Colace) capsule 100 mg  100 mg Oral BID Carter Oliveros M.D.   100 mg at 02/04/24 0723    senna-docusate (Pericolace Or Senokot S) 8.6-50 MG per tablet 1 Tablet  1 Tablet Oral BID Carter Oliveros M.D.   1 Tablet at 02/04/24 0723    mag hydrox-al hydrox-simeth (Maalox Plus Es Or Mylanta Ds) suspension 10 mL  10 mL Oral Q6HRS PRN Carter Oliveros M.D.            Labs:   Recent Results (from the past 24 hour(s))   GLUCOSE 1HR GESTATIONAL    Collection Time: 02/04/24  7:23 AM   Result Value Ref Range    Glucose, Post Dose 184 (H) 70 - 139 mg/dL   CBC WITH DIFFERENTIAL    Collection Time: 02/04/24  7:23 AM   Result Value Ref Range    WBC 15.0 (H) 4.8 - 10.8 K/uL    RBC 3.79 (L) 4.20 - 5.40 M/uL    Hemoglobin 11.7 (L) 12.0 - 16.0 g/dL    Hematocrit 33.9 (L) 37.0 - 47.0 %    MCV 89.4 81.4 - 97.8 fL    MCH 30.9 27.0 - 33.0 pg    MCHC 34.5 32.2 - 35.5 g/dL    RDW 43.9 35.9 - 50.0 fL    Platelet Count 210 164 - 446 K/uL    MPV 11.4 9.0 - 12.9 fL    Neutrophils-Polys 83.30 (H) 44.00 - 72.00 %    Lymphocytes 12.60 (L) 22.00 - 41.00 %    Monocytes 3.20 0.00 - 13.40 %    Eosinophils 0.30 0.00 - 6.90 %    Basophils 0.10 0.00 - 1.80 %    Immature Granulocytes 0.50 0.00 - 0.90 %    Nucleated RBC 0.00 0.00 - 0.20 /100 WBC    Neutrophils (Absolute) 12.47 (H) 1.82 - 7.42 K/uL    Lymphs (Absolute) 1.88 1.00 - 4.80 K/uL    Monos (Absolute) 0.48 0.00 - 0.85 K/uL    Eos (Absolute) 0.05 0.00 - 0.51 K/uL    Baso (Absolute) 0.02 0.00 - 0.12 K/uL    Immature Granulocytes (abs) 0.07 0.00 - 0.11 K/uL    NRBC (Absolute) 0.00 K/uL         Imaging: No results  found.    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 Currently denies symptoms of infection, abruption, or labor. Plan for ongoing close observation as inpatient. We reviewed the risk of cord prolapse in the setting of breech presentation and cervical dilation. It has been emphasized to the patient that she needs to alert staff immediately with any changes in symptoms or concern regarding fetal movement. The patient expresses understanding.     Assessment:  - IUP at 29w3d  - Advanced cervical dilation  - Possible PPROM - + amnisure / negative fern on repeat exam. MARV has continued to be within normal limits on ultrasound, last performed 2/3/24, indicating that the amniotic membrane may in fact be intact.  - Breech presentation  - History of retained placenta in G2 delivery requiring D&C  - GBS neg  - Undesired future fertility    Plan:   - S/p Neonatology consultation 24.   - S/p Magnesium for fetal neuro protection. Would plan to restart if concerns develop for labor or moving toward delivery < 32 weeks gestation.   - S/p BMZ -  - Elevated 1 hr GCT of 184; plan to completed 3 hr GTT tomorrow.   - Tdap today.  - S/p latency antibiotics   - Bowel regimen: colace / senna. Adding as needed Miralax.  - FM - Continued EFM BID. Reviewed with the patient that the fetal status is unknown during time spent off the monitor and I encouraged the patient to alert staff with any concerns regarding decreased fetal movement or changes in symptoms as increased fetal monitoring would be indicated.  - MOD -  delivery given breech presentation  - VTE PPX - ambulation encouraged / SCD's recommended in bed.   - Patient desires BTL if delivered by     Plan of care discussed with the patient and she is in agreement with this plan. All questions and concerns were answered.     Immanuel Mota MD

## 2024-02-04 NOTE — NST NOTE
EFM note (0800 2/4/24)    Tiffanie Lujan   Gestational age: 29w3d     Indication: Advanced cervical dilation    NST: Baseline 140 bpm, moderate variability, + accelerations / one 15x15 variable decelerations at 0904.     [place] - quiet     Interpretation: FHT overall reactive with moderate variability.    Plan: EFM BID    Immanuel Mota MD

## 2024-02-04 NOTE — CARE PLAN
The patient is Stable - Low risk of patient condition declining or worsening    Shift Goals  Clinical Goals: maintain healthy pregnancy  Patient Goals: stay pregnant  Family Goals: support    Progress made toward(s) clinical / shift goals:  NO FEVER, REACTIVE NST    Patient is not progressing towards the following goals:

## 2024-02-04 NOTE — PROGRESS NOTES
0700. Report from Halima SAUER. POC discussed and resumed.    0723. At the bedside. Pt declines uc's or bleeding, notes +FM and vocational leaking of fluid. Vitals and assessment done. Pt has no needs at this time.    1900. Report to Ade SAUER. POC discussed.

## 2024-02-05 ENCOUNTER — ANESTHESIA EVENT (OUTPATIENT)
Dept: OBGYN | Facility: MEDICAL CENTER | Age: 29
End: 2024-02-05
Payer: COMMERCIAL

## 2024-02-05 ENCOUNTER — ANESTHESIA (OUTPATIENT)
Dept: OBGYN | Facility: MEDICAL CENTER | Age: 29
End: 2024-02-05
Payer: COMMERCIAL

## 2024-02-05 LAB
BASOPHILS # BLD AUTO: 0.2 % (ref 0–1.8)
BASOPHILS # BLD: 0.04 K/UL (ref 0–0.12)
EOSINOPHIL # BLD AUTO: 0.06 K/UL (ref 0–0.51)
EOSINOPHIL NFR BLD: 0.4 % (ref 0–6.9)
ERYTHROCYTE [DISTWIDTH] IN BLOOD BY AUTOMATED COUNT: 45.1 FL (ref 35.9–50)
GLUCOSE 1H P CHAL SERPL-MCNC: 128 MG/DL (ref 65–199)
GLUCOSE 2H P CHAL SERPL-MCNC: 59 MG/DL (ref 65–139)
GLUCOSE 3H P CHAL SERPL-MCNC: 168 MG/DL (ref 65–109)
GLUCOSE BS SERPL-MCNC: 74 MG/DL (ref 65–99)
HCT VFR BLD AUTO: 35.4 % (ref 37–47)
HGB BLD-MCNC: 12.1 G/DL (ref 12–16)
IMM GRANULOCYTES # BLD AUTO: 0.08 K/UL (ref 0–0.11)
IMM GRANULOCYTES NFR BLD AUTO: 0.5 % (ref 0–0.9)
LYMPHOCYTES # BLD AUTO: 2.01 K/UL (ref 1–4.8)
LYMPHOCYTES NFR BLD: 12 % (ref 22–41)
MCH RBC QN AUTO: 31.3 PG (ref 27–33)
MCHC RBC AUTO-ENTMCNC: 34.2 G/DL (ref 32.2–35.5)
MCV RBC AUTO: 91.7 FL (ref 81.4–97.8)
MONOCYTES # BLD AUTO: 0.81 K/UL (ref 0–0.85)
MONOCYTES NFR BLD AUTO: 4.8 % (ref 0–13.4)
NEUTROPHILS # BLD AUTO: 13.81 K/UL (ref 1.82–7.42)
NEUTROPHILS NFR BLD: 82.1 % (ref 44–72)
NRBC # BLD AUTO: 0 K/UL
NRBC BLD-RTO: 0 /100 WBC (ref 0–0.2)
PLATELET # BLD AUTO: 233 K/UL (ref 164–446)
PMV BLD AUTO: 11.6 FL (ref 9–12.9)
RBC # BLD AUTO: 3.86 M/UL (ref 4.2–5.4)
WBC # BLD AUTO: 16.8 K/UL (ref 4.8–10.8)

## 2024-02-05 PROCEDURE — C1765 ADHESION BARRIER: HCPCS | Performed by: OBSTETRICS & GYNECOLOGY

## 2024-02-05 PROCEDURE — 160035 HCHG PACU - 1ST 60 MINS PHASE I: Performed by: OBSTETRICS & GYNECOLOGY

## 2024-02-05 PROCEDURE — 160029 HCHG SURGERY MINUTES - 1ST 30 MINS LEVEL 4: Performed by: OBSTETRICS & GYNECOLOGY

## 2024-02-05 PROCEDURE — 160041 HCHG SURGERY MINUTES - EA ADDL 1 MIN LEVEL 4: Performed by: OBSTETRICS & GYNECOLOGY

## 2024-02-05 PROCEDURE — 90471 IMMUNIZATION ADMIN: CPT

## 2024-02-05 PROCEDURE — 700111 HCHG RX REV CODE 636 W/ 250 OVERRIDE (IP): Mod: JZ | Performed by: ANESTHESIOLOGY

## 2024-02-05 PROCEDURE — 85025 COMPLETE CBC W/AUTO DIFF WBC: CPT

## 2024-02-05 PROCEDURE — A9270 NON-COVERED ITEM OR SERVICE: HCPCS | Performed by: STUDENT IN AN ORGANIZED HEALTH CARE EDUCATION/TRAINING PROGRAM

## 2024-02-05 PROCEDURE — 700111 HCHG RX REV CODE 636 W/ 250 OVERRIDE (IP): Performed by: OBSTETRICS & GYNECOLOGY

## 2024-02-05 PROCEDURE — 36415 COLL VENOUS BLD VENIPUNCTURE: CPT

## 2024-02-05 PROCEDURE — C1755 CATHETER, INTRASPINAL: HCPCS | Performed by: OBSTETRICS & GYNECOLOGY

## 2024-02-05 PROCEDURE — 59514 CESAREAN DELIVERY ONLY: CPT | Mod: AS | Performed by: NURSE PRACTITIONER

## 2024-02-05 PROCEDURE — 770002 HCHG ROOM/CARE - OB PRIVATE (112)

## 2024-02-05 PROCEDURE — 59514 CESAREAN DELIVERY ONLY: CPT | Performed by: OBSTETRICS & GYNECOLOGY

## 2024-02-05 PROCEDURE — A9270 NON-COVERED ITEM OR SERVICE: HCPCS | Performed by: ANESTHESIOLOGY

## 2024-02-05 PROCEDURE — 58611 LIGATE OVIDUCT(S) ADD-ON: CPT | Performed by: OBSTETRICS & GYNECOLOGY

## 2024-02-05 PROCEDURE — 700111 HCHG RX REV CODE 636 W/ 250 OVERRIDE (IP): Performed by: STUDENT IN AN ORGANIZED HEALTH CARE EDUCATION/TRAINING PROGRAM

## 2024-02-05 PROCEDURE — 700105 HCHG RX REV CODE 258: Performed by: ANESTHESIOLOGY

## 2024-02-05 PROCEDURE — 160002 HCHG RECOVERY MINUTES (STAT): Performed by: OBSTETRICS & GYNECOLOGY

## 2024-02-05 PROCEDURE — 90715 TDAP VACCINE 7 YRS/> IM: CPT | Performed by: STUDENT IN AN ORGANIZED HEALTH CARE EDUCATION/TRAINING PROGRAM

## 2024-02-05 PROCEDURE — 700102 HCHG RX REV CODE 250 W/ 637 OVERRIDE(OP): Performed by: STUDENT IN AN ORGANIZED HEALTH CARE EDUCATION/TRAINING PROGRAM

## 2024-02-05 PROCEDURE — 59025 FETAL NON-STRESS TEST: CPT

## 2024-02-05 PROCEDURE — 88307 TISSUE EXAM BY PATHOLOGIST: CPT

## 2024-02-05 PROCEDURE — 700105 HCHG RX REV CODE 258: Performed by: OBSTETRICS & GYNECOLOGY

## 2024-02-05 PROCEDURE — 160009 HCHG ANES TIME/MIN: Performed by: OBSTETRICS & GYNECOLOGY

## 2024-02-05 PROCEDURE — 700102 HCHG RX REV CODE 250 W/ 637 OVERRIDE(OP): Performed by: ANESTHESIOLOGY

## 2024-02-05 PROCEDURE — 160048 HCHG OR STATISTICAL LEVEL 1-5: Performed by: OBSTETRICS & GYNECOLOGY

## 2024-02-05 PROCEDURE — 88302 TISSUE EXAM BY PATHOLOGIST: CPT

## 2024-02-05 PROCEDURE — 700102 HCHG RX REV CODE 250 W/ 637 OVERRIDE(OP): Performed by: OBSTETRICS & GYNECOLOGY

## 2024-02-05 PROCEDURE — A9270 NON-COVERED ITEM OR SERVICE: HCPCS | Performed by: OBSTETRICS & GYNECOLOGY

## 2024-02-05 PROCEDURE — 58611 LIGATE OVIDUCT(S) ADD-ON: CPT | Mod: AS | Performed by: NURSE PRACTITIONER

## 2024-02-05 PROCEDURE — 3E0234Z INTRODUCTION OF SERUM, TOXOID AND VACCINE INTO MUSCLE, PERCUTANEOUS APPROACH: ICD-10-PCS | Performed by: OBSTETRICS & GYNECOLOGY

## 2024-02-05 RX ORDER — BUPIVACAINE HYDROCHLORIDE 7.5 MG/ML
INJECTION, SOLUTION INTRASPINAL PRN
Status: DISCONTINUED | OUTPATIENT
Start: 2024-02-05 | End: 2024-02-05 | Stop reason: SURG

## 2024-02-05 RX ORDER — KETOROLAC TROMETHAMINE 15 MG/ML
INJECTION, SOLUTION INTRAMUSCULAR; INTRAVENOUS PRN
Status: DISCONTINUED | OUTPATIENT
Start: 2024-02-05 | End: 2024-02-05 | Stop reason: SURG

## 2024-02-05 RX ORDER — CITRIC ACID/SODIUM CITRATE 334-500MG
30 SOLUTION, ORAL ORAL ONCE
Status: COMPLETED | OUTPATIENT
Start: 2024-02-05 | End: 2024-02-05

## 2024-02-05 RX ORDER — MAGNESIUM SULFATE 1 G/100ML
1 INJECTION INTRAVENOUS ONCE
Status: DISCONTINUED | OUTPATIENT
Start: 2024-02-05 | End: 2024-02-05

## 2024-02-05 RX ORDER — SODIUM CHLORIDE, SODIUM GLUCONATE, SODIUM ACETATE, POTASSIUM CHLORIDE AND MAGNESIUM CHLORIDE 526; 502; 368; 37; 30 MG/100ML; MG/100ML; MG/100ML; MG/100ML; MG/100ML
1500 INJECTION, SOLUTION INTRAVENOUS ONCE
Status: COMPLETED | OUTPATIENT
Start: 2024-02-05 | End: 2024-02-05

## 2024-02-05 RX ORDER — MAGNESIUM SULFATE HEPTAHYDRATE 40 MG/ML
4 INJECTION, SOLUTION INTRAVENOUS ONCE
Status: DISCONTINUED | OUTPATIENT
Start: 2024-02-05 | End: 2024-02-05

## 2024-02-05 RX ORDER — PHENYLEPHRINE HCL IN 0.9% NACL 0.5 MG/5ML
SYRINGE (ML) INTRAVENOUS PRN
Status: DISCONTINUED | OUTPATIENT
Start: 2024-02-05 | End: 2024-02-05 | Stop reason: SURG

## 2024-02-05 RX ORDER — METOCLOPRAMIDE HYDROCHLORIDE 5 MG/ML
10 INJECTION INTRAMUSCULAR; INTRAVENOUS ONCE
Status: COMPLETED | OUTPATIENT
Start: 2024-02-05 | End: 2024-02-05

## 2024-02-05 RX ORDER — MAGNESIUM SULFATE HEPTAHYDRATE 40 MG/ML
4 INJECTION, SOLUTION INTRAVENOUS ONCE
Status: COMPLETED | OUTPATIENT
Start: 2024-02-05 | End: 2024-02-05

## 2024-02-05 RX ORDER — CALCIUM GLUCONATE 94 MG/ML
1 INJECTION, SOLUTION INTRAVENOUS
Status: DISCONTINUED | OUTPATIENT
Start: 2024-02-05 | End: 2024-02-06 | Stop reason: HOSPADM

## 2024-02-05 RX ORDER — SODIUM CHLORIDE, SODIUM LACTATE, POTASSIUM CHLORIDE, CALCIUM CHLORIDE 600; 310; 30; 20 MG/100ML; MG/100ML; MG/100ML; MG/100ML
INJECTION, SOLUTION INTRAVENOUS CONTINUOUS
Status: DISCONTINUED | OUTPATIENT
Start: 2024-02-05 | End: 2024-02-06

## 2024-02-05 RX ORDER — SODIUM CHLORIDE, SODIUM GLUCONATE, SODIUM ACETATE, POTASSIUM CHLORIDE AND MAGNESIUM CHLORIDE 526; 502; 368; 37; 30 MG/100ML; MG/100ML; MG/100ML; MG/100ML; MG/100ML
INJECTION, SOLUTION INTRAVENOUS
Status: DISCONTINUED | OUTPATIENT
Start: 2024-02-05 | End: 2024-02-05 | Stop reason: SURG

## 2024-02-05 RX ORDER — CEFAZOLIN SODIUM 1 G/3ML
INJECTION, POWDER, FOR SOLUTION INTRAMUSCULAR; INTRAVENOUS PRN
Status: DISCONTINUED | OUTPATIENT
Start: 2024-02-05 | End: 2024-02-05 | Stop reason: SURG

## 2024-02-05 RX ORDER — MORPHINE SULFATE 0.5 MG/ML
INJECTION, SOLUTION EPIDURAL; INTRATHECAL; INTRAVENOUS PRN
Status: DISCONTINUED | OUTPATIENT
Start: 2024-02-05 | End: 2024-02-05 | Stop reason: SURG

## 2024-02-05 RX ORDER — MAGNESIUM SULFATE HEPTAHYDRATE 40 MG/ML
1 INJECTION, SOLUTION INTRAVENOUS CONTINUOUS
Status: DISCONTINUED | OUTPATIENT
Start: 2024-02-05 | End: 2024-02-06

## 2024-02-05 RX ORDER — ONDANSETRON 2 MG/ML
INJECTION INTRAMUSCULAR; INTRAVENOUS PRN
Status: DISCONTINUED | OUTPATIENT
Start: 2024-02-05 | End: 2024-02-05 | Stop reason: SURG

## 2024-02-05 RX ADMIN — VITAMIN A, VITAMIN C, VITAMIN D, VITAMIN E, THIAMINE, RIBOFLAVIN, NIACIN, VITAMIN B6, FOLIC ACID, VITAMIN B12, CALCIUM, IRON, ZINC, COPPER 1 TABLET: 4000; 120; 400; 22; 1.84; 3; 20; 10; 1; 12; 200; 27; 25; 2 TABLET ORAL at 08:26

## 2024-02-05 RX ADMIN — ONDANSETRON 4 MG: 2 INJECTION INTRAMUSCULAR; INTRAVENOUS at 23:04

## 2024-02-05 RX ADMIN — SODIUM CITRATE AND CITRIC ACID MONOHYDRATE 30 ML: 334; 500 SOLUTION ORAL at 22:30

## 2024-02-05 RX ADMIN — OXYTOCIN 500 ML: 10 INJECTION, SOLUTION INTRAMUSCULAR; INTRAVENOUS at 23:19

## 2024-02-05 RX ADMIN — PHENYLEPHRINE HYDROCHLORIDE 50 MCG/MIN: 10 INJECTION INTRAVENOUS at 23:02

## 2024-02-05 RX ADMIN — Medication 100 MCG: at 23:06

## 2024-02-05 RX ADMIN — SODIUM CHLORIDE, POTASSIUM CHLORIDE, SODIUM LACTATE AND CALCIUM CHLORIDE: 600; 310; 30; 20 INJECTION, SOLUTION INTRAVENOUS at 22:14

## 2024-02-05 RX ADMIN — SODIUM CHLORIDE, SODIUM GLUCONATE, SODIUM ACETATE, POTASSIUM CHLORIDE AND MAGNESIUM CHLORIDE: 526; 502; 368; 37; 30 INJECTION, SOLUTION INTRAVENOUS at 22:55

## 2024-02-05 RX ADMIN — BUPIVACAINE HYDROCHLORIDE IN DEXTROSE 1.6 ML: 7.5 INJECTION, SOLUTION SUBARACHNOID at 23:02

## 2024-02-05 RX ADMIN — MAGNESIUM SULFATE HEPTAHYDRATE 4 G: 4 INJECTION, SOLUTION INTRAVENOUS at 22:45

## 2024-02-05 RX ADMIN — ACETAMINOPHEN 650 MG: 325 TABLET, FILM COATED ORAL at 16:12

## 2024-02-05 RX ADMIN — Medication 100 MCG: at 23:02

## 2024-02-05 RX ADMIN — METOCLOPRAMIDE 10 MG: 5 INJECTION, SOLUTION INTRAMUSCULAR; INTRAVENOUS at 22:30

## 2024-02-05 RX ADMIN — FAMOTIDINE 20 MG: 10 INJECTION, SOLUTION INTRAVENOUS at 22:30

## 2024-02-05 RX ADMIN — ACETAMINOPHEN 650 MG: 325 TABLET, FILM COATED ORAL at 09:29

## 2024-02-05 RX ADMIN — CLOSTRIDIUM TETANI TOXOID ANTIGEN (FORMALDEHYDE INACTIVATED), CORYNEBACTERIUM DIPHTHERIAE TOXOID ANTIGEN (FORMALDEHYDE INACTIVATED), BORDETELLA PERTUSSIS TOXOID ANTIGEN (GLUTARALDEHYDE INACTIVATED), BORDETELLA PERTUSSIS FILAMENTOUS HEMAGGLUTININ ANTIGEN (FORMALDEHYDE INACTIVATED), BORDETELLA PERTUSSIS PERTACTIN ANTIGEN, AND BORDETELLA PERTUSSIS FIMBRIAE 2/3 ANTIGEN 0.5 ML: 5; 2; 2.5; 5; 3; 5 INJECTION, SUSPENSION INTRAMUSCULAR at 08:13

## 2024-02-05 RX ADMIN — MORPHINE SULFATE 150 MCG: 0.5 INJECTION, SOLUTION EPIDURAL; INTRATHECAL; INTRAVENOUS at 23:02

## 2024-02-05 RX ADMIN — FENTANYL CITRATE 15 MCG: 50 INJECTION, SOLUTION INTRAMUSCULAR; INTRAVENOUS at 23:02

## 2024-02-05 RX ADMIN — DOCUSATE SODIUM 50 MG AND SENNOSIDES 8.6 MG 1 TABLET: 8.6; 5 TABLET, FILM COATED ORAL at 17:58

## 2024-02-05 RX ADMIN — SODIUM CHLORIDE, SODIUM GLUCONATE, SODIUM ACETATE, POTASSIUM CHLORIDE AND MAGNESIUM CHLORIDE 1500 ML: 526; 502; 368; 37; 30 INJECTION, SOLUTION INTRAVENOUS at 22:45

## 2024-02-05 RX ADMIN — KETOROLAC TROMETHAMINE 30 MG: 15 INJECTION, SOLUTION INTRAMUSCULAR; INTRAVENOUS at 23:36

## 2024-02-05 RX ADMIN — DOCUSATE SODIUM 100 MG: 100 CAPSULE, LIQUID FILLED ORAL at 17:58

## 2024-02-05 RX ADMIN — CEFAZOLIN 2 G: 1 INJECTION, POWDER, FOR SOLUTION INTRAMUSCULAR; INTRAVENOUS at 23:04

## 2024-02-05 RX ADMIN — POLYETHYLENE GLYCOL 3350 1 PACKET: 17 POWDER, FOR SOLUTION ORAL at 11:55

## 2024-02-05 ASSESSMENT — PAIN DESCRIPTION - PAIN TYPE
TYPE: ACUTE PAIN

## 2024-02-05 NOTE — PROGRESS NOTES
0450- Report received from Ade SAUER. Care assumed.     0515- Lab to bedside to draw pt's fasting glucose.    0520- Pt finished glucola drink.    0610- Pt reports light bloody spotting in discharge when wiping. Pt declines cramping, ctx, or pain.     0612- Dr. Castellano called and given report including light bloody spotting. Orders received for EFM/[place] for one hour.     0620- EFM/[place] placed. Pt reports FM.       0700- Report given to Mckayla SAUER. Care transferred

## 2024-02-05 NOTE — PROGRESS NOTES
MFM  PROGRESS NOTE    Primary OB/GYN: PNC with OB in [place]. Current care with [facility] (they are aware and planning to do the delivery when indicated).     SUBJECTIVE  Patient with some spotting overnight and yellow discharge. Denies cramping. + FM.     OBJECTIVE:  Vitals:    24 0726   BP: 101/68   Pulse: 100   Resp: 14   Temp: 36.2 °C (97.1 °F)   SpO2:       PHYSICAL EXAM:  General:   Alert, conversational, pleasant, no acute distress  Lungs:   Normal work of breathing  Abdomen:  Soft, gravid, non-tender, non-distended. No fundal tenderness.     OB Ultrasound:  Outpatient ultrasound (24) - BREECH, EFW 1061g (26%), posterior placenta without previa. MARV 11.9 cm.     Bedside ultrasound (2/3) - Breech presentation, MARV 10.8 cm     FHT: See separate NST report    Medications:   Current Facility-Administered Medications   Medication Dose Route Frequency Provider Last Rate Last Admin    polyethylene glycol/lytes (Miralax) Packet 1 Packet  1 Packet Oral QDAY PRN Doug Mota M.D.   1 Packet at 24 1252    lidocaine (XYLOCAINE) 1%  injection  20 mL Subcutaneous Once PRN Carter Oliveros M.D.        terbutaline (Brethine) injection 0.25 mg  0.25 mg Subcutaneous Once PRN Carter Oliveros M.D.        oxytocin (Pitocin) infusion (for post delivery)  125 mL/hr Intravenous Continuous Carter Oliveros M.D.   Held at 24 1345    oxytocin (Pitocin) injection 10 Units  10 Units Intramuscular Once PRN Carter Oliveros M.D.        calcium GLUConate injection 1 g  1 g Intravenous Once PRN Carter Oliveros M.D.        ondansetron (Zofran ODT) dispertab 4 mg  4 mg Oral Q6HRS PRN Carter Oliveros M.D.        Or    ondansetron (Zofran) syringe/vial injection 4 mg  4 mg Intravenous Q6HRS PRN Carter Oliveros M.D.        acetaminophen (Tylenol) tablet 650 mg  650 mg Oral Q6HRS PRN Carter Oliveros M.D.   650 mg at 24 1534    calcium carbonate (Tums) chewable tab 500 mg  500 mg Oral 4X/DAY PRN  Carter Oliveros M.D.        lactated ringers infusion   Intravenous Continuous Carter Oliveros M.D. 75 mL/hr at 24 1647 New Bag at 24 1647    prenatal plus vitamin (Stuartnatal 1+1) 27-1 MG tablet 1 Tablet  1 Tablet Oral Daily-0800 Carter Oliveros M.D.   1 Tablet at 24 0723    docusate sodium (Colace) capsule 100 mg  100 mg Oral BID Carter Oliveros M.D.   100 mg at 24 1729    senna-docusate (Pericolace Or Senokot S) 8.6-50 MG per tablet 1 Tablet  1 Tablet Oral BID Carter Oliveros M.D.   1 Tablet at 24 1729    mag hydrox-al hydrox-simeth (Maalox Plus Es Or Mylanta Ds) suspension 10 mL  10 mL Oral Q6HRS PRN Carter Oliveros M.D.            Labs:   Recent Results (from the past 24 hour(s))   GLUCOSE TOLERANCE 3 HOUR    Collection Time: 24  5:17 AM   Result Value Ref Range    Glucose, Fasting 74 65 - 99 mg/dL    Glucose 1 Hour 128 65 - 199 mg/dL    Glucose 3 Hour 168 (H) 65 - 109 mg/dL         ASSESSMENT & PLAN    Assessment:  - IUP at 29w4d  - Advanced cervical dilation  - Possible PPROM - + amnisure / negative fern on repeat exam. MARV has continued to be within normal limits on ultrasound, last performed 2/3/24, indicating that the amniotic membrane may in fact be intact.  - Breech presentation  - History of retained placenta in G2 delivery requiring D&C  - GBS neg  - Undesired future fertility    Plan:   - S/p Neonatology consultation 24.   - S/p Magnesium for fetal neuro protection. Would plan to restart if concerns develop for labor or moving toward delivery < 32 weeks gestation.   - S/p BMZ -  - Elevated 1 hr GCT of 184; 3-hour in process today   - s/p Tdap  - S/p latency antibiotics   - Bowel regimen: colace / senna. Adding as needed Miralax.  - FM - Continued EFM BID. - MOD -  delivery given breech presentation  - VTE PPX - ambulation encouraged / SCD's recommended in bed.   - Patient desires BTL if delivered by     Plan of care discussed with the  patient and she is in agreement with this plan. All questions and concerns were answered.     Eva Lee M.D.

## 2024-02-05 NOTE — PROGRESS NOTES
1900-Rcvd report from dayshift RN and assumed care of pt.  2017-TOCO/FM applied for 1 hour monitoring and NST.   Declines UC's or bleeding with +FM  2990-Report given to Sarita SAUER

## 2024-02-05 NOTE — CARE PLAN
The patient is Stable - Low risk of patient condition declining or worsening    Shift Goals  Clinical Goals: Reassuring fetal status, VSS, pt to remian free from infection  Patient Goals: Remain pregnant, receive TDAP vaccine  Family Goals: Support    Progress made toward(s) clinical / shift goals:  Pt afebrile. Reactive NST obtained.     Problem: Psychosocial - L&D  Goal: Patient's level of anxiety will decrease  Outcome: Progressing     Problem: Risk for Infection and Impaired Wound Healing  Goal: Patient will remain free from infection  Outcome: Progressing     Problem: Risk for Injury  Goal: Patient and fetus will be free of preventable injury/complications  Outcome: Progressing       Patient is not progressing towards the following goals:

## 2024-02-05 NOTE — PROGRESS NOTES
0700: Report received from CRISTIANE Stein.   0730: RN at bedside for assessment. Pt reports +FM. Denies contractions. Pt notes occasional LOF; states fluid last night was yellow-colored with one small streak of blood. Pt educated to notify RN if bleeding continues and increases. Pt denies needs at this time. 3-hour GTT in progress.   1900: Report given to CRISTIANE Stein.

## 2024-02-05 NOTE — NST NOTE
Tiffanie Lujan   Gestational age: 29w4d     Indication: Advanced cervical dilation    NST: Baseline 150 bpm, moderate variability, + accelerations, rare variables    [place] - Irritable    Plan: EFM BID

## 2024-02-06 LAB
ABO GROUP BLD: NORMAL
BLD GP AB SCN SERPL QL: NORMAL
ERYTHROCYTE [DISTWIDTH] IN BLOOD BY AUTOMATED COUNT: 45.5 FL (ref 35.9–50)
HCT VFR BLD AUTO: 33.9 % (ref 37–47)
HGB BLD-MCNC: 11.4 G/DL (ref 12–16)
MCH RBC QN AUTO: 30.9 PG (ref 27–33)
MCHC RBC AUTO-ENTMCNC: 33.6 G/DL (ref 32.2–35.5)
MCV RBC AUTO: 91.9 FL (ref 81.4–97.8)
PATHOLOGY CONSULT NOTE: NORMAL
PLATELET # BLD AUTO: 203 K/UL (ref 164–446)
PMV BLD AUTO: 11.7 FL (ref 9–12.9)
RBC # BLD AUTO: 3.69 M/UL (ref 4.2–5.4)
RH BLD: NORMAL
WBC # BLD AUTO: 17.6 K/UL (ref 4.8–10.8)

## 2024-02-06 PROCEDURE — 700102 HCHG RX REV CODE 250 W/ 637 OVERRIDE(OP): Performed by: OBSTETRICS & GYNECOLOGY

## 2024-02-06 PROCEDURE — 86901 BLOOD TYPING SEROLOGIC RH(D): CPT

## 2024-02-06 PROCEDURE — 700102 HCHG RX REV CODE 250 W/ 637 OVERRIDE(OP): Performed by: ANESTHESIOLOGY

## 2024-02-06 PROCEDURE — 0UT70ZZ RESECTION OF BILATERAL FALLOPIAN TUBES, OPEN APPROACH: ICD-10-PCS | Performed by: OBSTETRICS & GYNECOLOGY

## 2024-02-06 PROCEDURE — 86850 RBC ANTIBODY SCREEN: CPT

## 2024-02-06 PROCEDURE — 700105 HCHG RX REV CODE 258: Performed by: OBSTETRICS & GYNECOLOGY

## 2024-02-06 PROCEDURE — 700111 HCHG RX REV CODE 636 W/ 250 OVERRIDE (IP): Performed by: ANESTHESIOLOGY

## 2024-02-06 PROCEDURE — 700111 HCHG RX REV CODE 636 W/ 250 OVERRIDE (IP): Performed by: OBSTETRICS & GYNECOLOGY

## 2024-02-06 PROCEDURE — 770002 HCHG ROOM/CARE - OB PRIVATE (112)

## 2024-02-06 PROCEDURE — 36415 COLL VENOUS BLD VENIPUNCTURE: CPT

## 2024-02-06 PROCEDURE — 85027 COMPLETE CBC AUTOMATED: CPT

## 2024-02-06 PROCEDURE — 86900 BLOOD TYPING SEROLOGIC ABO: CPT

## 2024-02-06 PROCEDURE — A9270 NON-COVERED ITEM OR SERVICE: HCPCS | Performed by: ANESTHESIOLOGY

## 2024-02-06 PROCEDURE — 99999 PR NO CHARGE: CPT | Performed by: OBSTETRICS & GYNECOLOGY

## 2024-02-06 PROCEDURE — A9270 NON-COVERED ITEM OR SERVICE: HCPCS | Performed by: OBSTETRICS & GYNECOLOGY

## 2024-02-06 RX ORDER — DIPHENHYDRAMINE HYDROCHLORIDE 50 MG/ML
25 INJECTION INTRAMUSCULAR; INTRAVENOUS EVERY 6 HOURS PRN
Status: DISCONTINUED | OUTPATIENT
Start: 2024-02-07 | End: 2024-02-09 | Stop reason: HOSPADM

## 2024-02-06 RX ORDER — SODIUM CHLORIDE, SODIUM LACTATE, POTASSIUM CHLORIDE, CALCIUM CHLORIDE 600; 310; 30; 20 MG/100ML; MG/100ML; MG/100ML; MG/100ML
INJECTION, SOLUTION INTRAVENOUS PRN
Status: DISCONTINUED | OUTPATIENT
Start: 2024-02-06 | End: 2024-02-09 | Stop reason: HOSPADM

## 2024-02-06 RX ORDER — HYDROMORPHONE HYDROCHLORIDE 1 MG/ML
0.1 INJECTION, SOLUTION INTRAMUSCULAR; INTRAVENOUS; SUBCUTANEOUS
Status: DISCONTINUED | OUTPATIENT
Start: 2024-02-06 | End: 2024-02-06

## 2024-02-06 RX ORDER — DIPHENHYDRAMINE HYDROCHLORIDE 50 MG/ML
12.5 INJECTION INTRAMUSCULAR; INTRAVENOUS
Status: DISCONTINUED | OUTPATIENT
Start: 2024-02-06 | End: 2024-02-06

## 2024-02-06 RX ORDER — HYDROMORPHONE HYDROCHLORIDE 1 MG/ML
0.4 INJECTION, SOLUTION INTRAMUSCULAR; INTRAVENOUS; SUBCUTANEOUS
Status: ACTIVE | OUTPATIENT
Start: 2024-02-06 | End: 2024-02-07

## 2024-02-06 RX ORDER — OXYCODONE HYDROCHLORIDE 10 MG/1
10 TABLET ORAL EVERY 4 HOURS PRN
Status: DISCONTINUED | OUTPATIENT
Start: 2024-02-07 | End: 2024-02-09 | Stop reason: HOSPADM

## 2024-02-06 RX ORDER — OXYCODONE HCL 5 MG/5 ML
5 SOLUTION, ORAL ORAL
Status: DISCONTINUED | OUTPATIENT
Start: 2024-02-06 | End: 2024-02-06

## 2024-02-06 RX ORDER — IBUPROFEN 800 MG/1
800 TABLET ORAL EVERY 8 HOURS
Status: DISCONTINUED | OUTPATIENT
Start: 2024-02-07 | End: 2024-02-06

## 2024-02-06 RX ORDER — LABETALOL HYDROCHLORIDE 5 MG/ML
5 INJECTION, SOLUTION INTRAVENOUS
Status: DISCONTINUED | OUTPATIENT
Start: 2024-02-06 | End: 2024-02-06

## 2024-02-06 RX ORDER — DIPHENHYDRAMINE HYDROCHLORIDE 50 MG/ML
12.5 INJECTION INTRAMUSCULAR; INTRAVENOUS EVERY 6 HOURS PRN
Status: ACTIVE | OUTPATIENT
Start: 2024-02-06 | End: 2024-02-07

## 2024-02-06 RX ORDER — ONDANSETRON 2 MG/ML
4 INJECTION INTRAMUSCULAR; INTRAVENOUS
Status: DISCONTINUED | OUTPATIENT
Start: 2024-02-06 | End: 2024-02-06

## 2024-02-06 RX ORDER — IBUPROFEN 800 MG/1
800 TABLET ORAL EVERY 8 HOURS
Status: COMPLETED | OUTPATIENT
Start: 2024-02-06 | End: 2024-02-09

## 2024-02-06 RX ORDER — MEPERIDINE HYDROCHLORIDE 25 MG/ML
12.5 INJECTION INTRAMUSCULAR; INTRAVENOUS; SUBCUTANEOUS
Status: DISCONTINUED | OUTPATIENT
Start: 2024-02-06 | End: 2024-02-06

## 2024-02-06 RX ORDER — DIPHENHYDRAMINE HYDROCHLORIDE 50 MG/ML
25 INJECTION INTRAMUSCULAR; INTRAVENOUS EVERY 6 HOURS PRN
Status: ACTIVE | OUTPATIENT
Start: 2024-02-06 | End: 2024-02-07

## 2024-02-06 RX ORDER — METOPROLOL TARTRATE 1 MG/ML
1 INJECTION, SOLUTION INTRAVENOUS
Status: DISCONTINUED | OUTPATIENT
Start: 2024-02-06 | End: 2024-02-06

## 2024-02-06 RX ORDER — OXYCODONE HYDROCHLORIDE 10 MG/1
10 TABLET ORAL EVERY 4 HOURS PRN
Status: ACTIVE | OUTPATIENT
Start: 2024-02-06 | End: 2024-02-07

## 2024-02-06 RX ORDER — HYDRALAZINE HYDROCHLORIDE 20 MG/ML
5 INJECTION INTRAMUSCULAR; INTRAVENOUS
Status: DISCONTINUED | OUTPATIENT
Start: 2024-02-06 | End: 2024-02-06

## 2024-02-06 RX ORDER — SODIUM CHLORIDE, SODIUM LACTATE, POTASSIUM CHLORIDE, CALCIUM CHLORIDE 600; 310; 30; 20 MG/100ML; MG/100ML; MG/100ML; MG/100ML
INJECTION, SOLUTION INTRAVENOUS CONTINUOUS
Status: DISCONTINUED | OUTPATIENT
Start: 2024-02-06 | End: 2024-02-06

## 2024-02-06 RX ORDER — DOCUSATE SODIUM 100 MG/1
100 CAPSULE, LIQUID FILLED ORAL 2 TIMES DAILY PRN
Status: DISCONTINUED | OUTPATIENT
Start: 2024-02-06 | End: 2024-02-09 | Stop reason: HOSPADM

## 2024-02-06 RX ORDER — ACETAMINOPHEN 500 MG
1000 TABLET ORAL EVERY 6 HOURS
Status: DISCONTINUED | OUTPATIENT
Start: 2024-02-07 | End: 2024-02-09 | Stop reason: HOSPADM

## 2024-02-06 RX ORDER — HALOPERIDOL 5 MG/ML
1 INJECTION INTRAMUSCULAR
Status: DISCONTINUED | OUTPATIENT
Start: 2024-02-06 | End: 2024-02-06

## 2024-02-06 RX ORDER — MIDAZOLAM HYDROCHLORIDE 1 MG/ML
1 INJECTION INTRAMUSCULAR; INTRAVENOUS
Status: DISCONTINUED | OUTPATIENT
Start: 2024-02-06 | End: 2024-02-06

## 2024-02-06 RX ORDER — OXYCODONE HYDROCHLORIDE 5 MG/1
5 TABLET ORAL EVERY 4 HOURS PRN
Status: ACTIVE | OUTPATIENT
Start: 2024-02-06 | End: 2024-02-07

## 2024-02-06 RX ORDER — ACETAMINOPHEN 500 MG
1000 TABLET ORAL EVERY 6 HOURS
Status: COMPLETED | OUTPATIENT
Start: 2024-02-06 | End: 2024-02-06

## 2024-02-06 RX ORDER — ONDANSETRON 4 MG/1
4 TABLET, ORALLY DISINTEGRATING ORAL EVERY 6 HOURS PRN
Status: DISCONTINUED | OUTPATIENT
Start: 2024-02-07 | End: 2024-02-09 | Stop reason: HOSPADM

## 2024-02-06 RX ORDER — IBUPROFEN 800 MG/1
800 TABLET ORAL EVERY 8 HOURS PRN
Status: DISCONTINUED | OUTPATIENT
Start: 2024-02-09 | End: 2024-02-09 | Stop reason: HOSPADM

## 2024-02-06 RX ORDER — HYDROMORPHONE HYDROCHLORIDE 1 MG/ML
0.2 INJECTION, SOLUTION INTRAMUSCULAR; INTRAVENOUS; SUBCUTANEOUS
Status: DISCONTINUED | OUTPATIENT
Start: 2024-02-06 | End: 2024-02-06

## 2024-02-06 RX ORDER — HYDROMORPHONE HYDROCHLORIDE 1 MG/ML
0.4 INJECTION, SOLUTION INTRAMUSCULAR; INTRAVENOUS; SUBCUTANEOUS
Status: DISCONTINUED | OUTPATIENT
Start: 2024-02-06 | End: 2024-02-06

## 2024-02-06 RX ORDER — EPHEDRINE SULFATE 50 MG/ML
5 INJECTION, SOLUTION INTRAVENOUS
Status: DISCONTINUED | OUTPATIENT
Start: 2024-02-06 | End: 2024-02-06

## 2024-02-06 RX ORDER — KETOROLAC TROMETHAMINE 15 MG/ML
15 INJECTION, SOLUTION INTRAMUSCULAR; INTRAVENOUS EVERY 6 HOURS
Status: DISCONTINUED | OUTPATIENT
Start: 2024-02-06 | End: 2024-02-06

## 2024-02-06 RX ORDER — DIPHENHYDRAMINE HCL 25 MG
25 TABLET ORAL EVERY 6 HOURS PRN
Status: DISCONTINUED | OUTPATIENT
Start: 2024-02-07 | End: 2024-02-09 | Stop reason: HOSPADM

## 2024-02-06 RX ORDER — ACETAMINOPHEN 500 MG
1000 TABLET ORAL EVERY 6 HOURS PRN
Status: DISCONTINUED | OUTPATIENT
Start: 2024-02-10 | End: 2024-02-09 | Stop reason: HOSPADM

## 2024-02-06 RX ORDER — HYDROMORPHONE HYDROCHLORIDE 1 MG/ML
0.2 INJECTION, SOLUTION INTRAMUSCULAR; INTRAVENOUS; SUBCUTANEOUS
Status: ACTIVE | OUTPATIENT
Start: 2024-02-06 | End: 2024-02-07

## 2024-02-06 RX ORDER — OXYCODONE HCL 5 MG/5 ML
10 SOLUTION, ORAL ORAL
Status: DISCONTINUED | OUTPATIENT
Start: 2024-02-06 | End: 2024-02-06

## 2024-02-06 RX ORDER — EPHEDRINE SULFATE 50 MG/ML
10 INJECTION, SOLUTION INTRAVENOUS
Status: ACTIVE | OUTPATIENT
Start: 2024-02-06 | End: 2024-02-07

## 2024-02-06 RX ORDER — OXYCODONE HYDROCHLORIDE 5 MG/1
5 TABLET ORAL EVERY 4 HOURS PRN
Status: DISCONTINUED | OUTPATIENT
Start: 2024-02-07 | End: 2024-02-09 | Stop reason: HOSPADM

## 2024-02-06 RX ORDER — ONDANSETRON 2 MG/ML
4 INJECTION INTRAMUSCULAR; INTRAVENOUS EVERY 6 HOURS PRN
Status: DISCONTINUED | OUTPATIENT
Start: 2024-02-07 | End: 2024-02-09 | Stop reason: HOSPADM

## 2024-02-06 RX ORDER — IBUPROFEN 800 MG/1
800 TABLET ORAL EVERY 8 HOURS PRN
Status: DISCONTINUED | OUTPATIENT
Start: 2024-02-10 | End: 2024-02-06

## 2024-02-06 RX ORDER — ONDANSETRON 2 MG/ML
4 INJECTION INTRAMUSCULAR; INTRAVENOUS EVERY 6 HOURS PRN
Status: ACTIVE | OUTPATIENT
Start: 2024-02-06 | End: 2024-02-07

## 2024-02-06 RX ADMIN — IBUPROFEN 800 MG: 800 TABLET, FILM COATED ORAL at 23:41

## 2024-02-06 RX ADMIN — KETOROLAC TROMETHAMINE 15 MG: 15 INJECTION, SOLUTION INTRAMUSCULAR; INTRAVENOUS at 06:18

## 2024-02-06 RX ADMIN — ACETAMINOPHEN 1000 MG: 500 TABLET ORAL at 16:31

## 2024-02-06 RX ADMIN — ACETAMINOPHEN 1000 MG: 500 TABLET ORAL at 10:04

## 2024-02-06 RX ADMIN — ACETAMINOPHEN 1000 MG: 500 TABLET ORAL at 02:37

## 2024-02-06 RX ADMIN — IBUPROFEN 800 MG: 800 TABLET, FILM COATED ORAL at 14:27

## 2024-02-06 RX ADMIN — OXYTOCIN 125 ML/HR: 10 INJECTION, SOLUTION INTRAMUSCULAR; INTRAVENOUS at 02:09

## 2024-02-06 RX ADMIN — DOCUSATE SODIUM 100 MG: 100 CAPSULE, LIQUID FILLED ORAL at 16:34

## 2024-02-06 RX ADMIN — ACETAMINOPHEN 1000 MG: 500 TABLET ORAL at 23:41

## 2024-02-06 ASSESSMENT — PAIN DESCRIPTION - PAIN TYPE
TYPE: SURGICAL PAIN
TYPE: ACUTE PAIN;SURGICAL PAIN
TYPE: SURGICAL PAIN
TYPE: ACUTE PAIN;SURGICAL PAIN

## 2024-02-06 ASSESSMENT — EDINBURGH POSTNATAL DEPRESSION SCALE (EPDS)
I HAVE BEEN ANXIOUS OR WORRIED FOR NO GOOD REASON: HARDLY EVER
I HAVE FELT SCARED OR PANICKY FOR NO GOOD REASON: NO, NOT MUCH
I HAVE LOOKED FORWARD WITH ENJOYMENT TO THINGS: AS MUCH AS I EVER DID
I HAVE FELT SAD OR MISERABLE: NO, NOT AT ALL
I HAVE BLAMED MYSELF UNNECESSARILY WHEN THINGS WENT WRONG: NO, NEVER
I HAVE BEEN ABLE TO LAUGH AND SEE THE FUNNY SIDE OF THINGS: AS MUCH AS I ALWAYS COULD
I HAVE BEEN SO UNHAPPY THAT I HAVE BEEN CRYING: NO, NEVER
I HAVE BEEN SO UNHAPPY THAT I HAVE HAD DIFFICULTY SLEEPING: NOT AT ALL
THE THOUGHT OF HARMING MYSELF HAS OCCURRED TO ME: NEVER
THINGS HAVE BEEN GETTING ON TOP OF ME: NO, I HAVE BEEN COPING AS WELL AS EVER

## 2024-02-06 ASSESSMENT — PAIN SCALES - GENERAL: PAIN_LEVEL: 2

## 2024-02-06 NOTE — ANESTHESIA PROCEDURE NOTES
Spinal Block    Date/Time: 2/5/2024 10:58 PM    Performed by: Erik Barreto D.O.  Authorized by: Erik Barreto D.O.    Start Time:  2/5/2024 10:58 PM  End Time:  2/5/2024 11:02 PM  Reason for Block: primary anesthetic    patient identified, IV checked, site marked, risks and benefits discussed, surgical consent, monitors and equipment checked, pre-op evaluation and timeout performed    Patient Position:  Sitting  Prep: ChloraPrep, patient draped and sterile technique    Monitoring:  Blood pressure, continuous pulse oximetry and heart rate  Approach:  Midline  Location:  L4-5  Injection Technique:  Single-shot  Skin infiltration:  Lidocaine  Strength:  1%  Dose:  3ml  Needle Type:  Pencan  Needle Gauge:  25 G  CSF flowing pre/post injection:  Yes  Sensory Level:  T4

## 2024-02-06 NOTE — PROGRESS NOTES
Obstetrics & Gynecology Post-Delivery Progress Note    Date of Service      28 y.o.  s/p  for breech   Delivery date: 2024          Subjective  Pt reports no acute concerns today.   Pain: Yes,  controlled  Bleeding: lochia less than regular menstrual bleeding  Tolerating PO: Yes  Voiding: without difficulty  Ambulating: yes  Passing flatus: Yes  Feeding: breastfeeding well      Objective  24hr VS:  Temp:  [36.2 °C (97.1 °F)-36.7 °C (98 °F)] 36.6 °C (97.9 °F)  Pulse:  [] 62  Resp:  [14-19] 16  BP: ()/(60-81) 106/69  SpO2:  [93 %-98 %] 96 %    Physical Exam  Gen: well and resting  CV: RRR, nl S1 and S2, no murmur  Resp: unlabored respirations, no intercostal retractions or accessory muscle use, clear to auscultation without rales or wheezes  Chest/Breasts: exam deferred  Abd: nontender, normal bowel sounds, soft  Fundus: firm, below umbilicus, and nontender  Incision: dressing clean, dry, intact  Perineum: deferred  Ext: symmetric and no edema, calves nontender    Labs:  Recent Labs     24  0723 24  1208 24  0714   WBC 15.0* 16.8* 17.6*   RBC 3.79* 3.86* 3.69*   HEMOGLOBIN 11.7* 12.1 11.4*   HEMATOCRIT 33.9* 35.4* 33.9*   MCV 89.4 91.7 91.9   MCH 30.9 31.3 30.9   RDW 43.9 45.1 45.5   PLATELETCT 210 233 203   MPV 11.4 11.6 11.7   NEUTSPOLYS 83.30* 82.10*  --    LYMPHOCYTES 12.60* 12.00*  --    MONOCYTES 3.20 4.80  --    EOSINOPHILS 0.30 0.40  --    BASOPHILS 0.10 0.20  --         Medications  acetaminophen, 1,000 mg, Oral, Q6HR  ketorolac, 15 mg, Intravenous, Q6HR  [START ON 2024] ibuprofen, 800 mg, Oral, Q8HRS  [START ON 2024] acetaminophen, 1,000 mg, Oral, Q6HRS      PRN medications: oxyCODONE immediate-release, oxyCODONE immediate release, HYDROmorphone, HYDROmorphone, ePHEDrine, ondansetron, diphenhydrAMINE, naloxone HCl (Narcan) 20 mg in  mL infusion, diphenhydrAMINE **OR** diphenhydrAMINE **OR** naloxone HCl (Narcan) 20 mg in  mL infusion, LR,  [START ON 2024] ibuprofen **FOLLOWED BY** [START ON 2/10/2024] ibuprofen, [START ON 2024] acetaminophen **FOLLOWED BY** [START ON 2/10/2024] acetaminophen, [START ON 2024] oxyCODONE immediate-release, [START ON 2024] oxyCODONE immediate release, [START ON 2024] ondansetron **OR** [START ON 2024] ondansetron, [START ON 2024] diphenhydrAMINE **OR** [START ON 2024] diphenhydrAMINE, docusate sodium, polyethylene glycol/lytes, calcium carbonate      Assessment/Plan  Tiffanie Lujan is a 28 y.o.  postpartum day #1  s/p  for breech     - Post care: meeting all goals  - Pain: controlled   - Rh+, Rubella Immune  - Method of Feeding: plans to breastfeed  - Method of Contraception: tubal ligation (already done)  VTE prophylaxis: patient ambulating    - Disposition: likely home PPD 4; baby in NICU        Freeman Marquis M.D.   PGY-1  [facility]

## 2024-02-06 NOTE — OP REPORT
DATE OF SERVICE: 2024     PREOPERATIVE DIAGNOSES:  1.  Intrauterine pregnancy at 29w5d  2.  Breech fetal presentation  3.  Labor  4.  Advanced cervical dilation  5.  Desires sterilization     POSTOPERATIVE DIAGNOSES:  1.  Intrauterine pregnancy at 29w5d  2.  same    PROCEDURE PERFORMED: Primary low transverse  section.  Bilateral salpingectomy     SURGEON: Gregory Villaseñor MD     ASSISTANT: Tiffany Mary CNM     ANESTHESIA:  Spinal.     ANESTHESIOLOGIST: Erik Barreto DO     SPECIMEN: Placenta and cord blood sent for evaluation     ESTIMATED BLOOD LOSS: 600 mL     FINDINGS:  A viable female infant, Apgars of 3, 6 and 7 in footling breech   presentation with clear amniotic fluid.  There was a normal uterus,   tubes, and ovaries bilaterally.     COMPLICATIONS:  None.     PROCEDURE:  After appropriate consents were obtained, the patient was taken to the operating room where spinal  anesthesia was applied without complications.  The patient was then prepped and draped in the usual sterile manner.  Clamp test on the skin verified adequate anesthesia.  A Pfannenstiel incision was made with a scalpel 3cm superior to the pubic symphysis and extended down to the rectus fascia.  The rectus fascia was incised with the scalpel and tented up. The underlying rectus muscle was  from the fascia first inferiorly then superiorly using the souza scissors.  The rectus muscle was  bluntly in the midline.  The peritoneum was entered bluntly in the midline. The peritoneum incision was extended superiorly and inferiorly with the Metzenbaum scissors with great care to avoid injury to underlying bowel or bladder.  The vesicouterine peritoneum was tented up and entered with Metzenbaum scissors,    and a bladder flap was created.  Bladder blade was placed.  An incision was made into the lower uterine segment transversely and incision was extended bluntly.  Amniotomy was performed and there was noted to be  clear amniotic fluid. The infant was in footling breech presentation.  The buttocks was delivered followed by the trunk of the body.  Once the scapula was seen, the arms were swept forward into the hysterotomy incision and then the head was delivered without any complications.  The mouth and nares were suctioned. The cord was doubly clamped and cut and the infant was handed off to awaiting neonatology team.  The placenta was then allowed to spontaneously deliver. The uterus was exteriorized and cleared of clots and debris.  The hysterotomy incision was reapproximated with 1-0 Vicryl suture in a running locked fashion..  Hemostasis was noted.  The tubes and ovaries were examined and noted to be normal.  As the patient desires sterilization, the left fallopian tube was grasped and elevated with Robinson clamps and with the help of a hand-held LigaSure device, the mesosalpinx was cauterized and transected to the level of cornua was reached.  The cornea was then transected and the tube removed.  In a similar manner, the right fallopian tube was grasped with Jennifer clamps and elevated, and with the help of a hand-held LigaSure device the mesosalpinx was cauterized and transected into the level of cornua was reached.  This tube was also transected and removed.  Excellent hemostasis was noted bilaterally.  The uterus was returned to the abdomen. The pericolic gutters were examined and any blood clots were removed.  The peritoneum was reapproximated with 3-0 Vicryl suture running.  The rectus muscles were examined and hemostatic.  The fascia was reapproximated with 0 Vicryl suture running.  The subcutaneous fat was irrigated and any small bleeders were bovied for hemostasis.  The subcutaneous fat was then reapproximated with 3-0 Vicryl suture running.  The skin was reapproximated with 4-0 Monocryl suture running. Steri strips and a dressing were placed.  Sponge, needle, instrument, and lap counts were correct x2.  Patient  tolerated the procedure well and went to recovery room in stable condition.        ____________________________________  Gregory Villaseñor MD

## 2024-02-06 NOTE — PROGRESS NOTES
28-year-old  3 para 2-0-0-2 at 29 weeks 4-day gestational age.  Patient mated with advanced cervical dilation.  Has received steroids course on  and .    Patient began to have contractions this evening, is now 6 to 7 cm dilated with bulging bag of water.  Bedside ultrasound confirms breech presentation.    Fetal monitoring shows baseline 145-150 bpm with moderate viability.  Occasional variables noted, uterine activity noted    Given advanced cervical elation and contractions, not a candidate for tocolysis at this time.  Will restart magnesium sulfate for neuro prophylaxis.  Patient has been quickly consented for  at this time.  She also desires sterilization.    Last ultrasound on 2024 shows estimated fetal weight 26 percentile at 1061 g.  MARV 11.9 cm.  Posterior placenta without previa    NICU notified.    Discussed with the patient the risks of  delivery. The risks include DVT/pulmonary embolism, pelvic scarring, pelvic pain, infection, bleeding, scarring, damage to other organs in the area of operation, anesthesia complications, and death. Specifically organs that can be damaged are bowel, bladder, ureters. I also discussed with the patient the risk of wound infection and wound breakdown. We discussed that these risks are greater in people that have diabetes or obesity. I also discussed the risk of emergency blood transfusion during procedure as well as emergency hysterectomy during procedure.  Patient also aware that undergoing bilateral salpingectomy will render her unable to bear children in the future and sterilized.  She agrees to undergo the procedure.  Patient had the opportunity to ask questions regarding procedures. All questions answered to the patient's satisfaction. Pt agrees to proceed with surgery.

## 2024-02-06 NOTE — PROGRESS NOTES
4921- Bedside report received from CRISTIANE Claire.  Patient denied needs.  8875- Via SIMEON Mayo-felix  #[id number], reviewed plan of care.  Patient verbalized understanding.  Patient assessment done.  Patient reported she is passing flatus.  Patient denied dizziness.  Patient sat up independently at the edge of the bed, then stood at the side of the bed.  Patient denied dizziness and ambulated to the bathroom with standby assist.  Randi-care and catheter care done.  Pads and underwear changed.  Patient up to the sink for hand hygiene and oral hygiene.  Patient denied dizziness and stated she wanted to visit her infant in the NICU.  Patient instructed to ambulate in hallways four times a day.  Patient taken to NICU via wheelchair by the FOB.  1120- Patient is not in the room.  1150- Patient is not in the room.  1255- IV leaking.  Dr. Fermin notified.  Telephone order received: may discontinue IV and IV toradol orders and start ibuprofen as ordered  1405- Patient up to the bathroom.  Patient voided 300 mls.  1750- Patient is back in her room after ambulating in the hallways.  Patient is sitting up in the bedside chair, using the breast pump.  Patient denied pain.

## 2024-02-06 NOTE — DISCHARGE PLANNING
Discharge Planning Assessment Post Partum    Reason for Referral: NICU  Address: [address]  Type of Living Situation:Stable  Mom Diagnosis: Postpartum  Baby Diagnosis: NICU 29.5  Primary Language: English     Name of Baby: Blossom Lujan  Father of the Baby: Gaudencio Lujan  Involved in baby’s care? Yes  Contact Information: [phone]    Prenatal Care: Yes  Mom's PCP: None  PCP for new baby:List given    Support System: Yes  Coping/Bonding between mother & baby: MOB coping/bonding   Source of Feeding: Breast  Supplies for Infant: FOB stated working on supplies    Mom's Insurance: [place]  Baby Covered on Insurance:Yes  Mother Employed/School: Yes  Other children in the home/names & ages: 11 yr old and 2 yr old     Financial Hardship/Income: Some due to long NICU stay and hospitalization    Mom's Mental status: Stable  Services used prior to admit: None    CPS History: None  Psychiatric History: None  Domestic Violence History: None  Drug/ETOH History: None    Resources Provided:  provided support, discussed Itz Lozada, and pediatrician list   Referrals Made: Itz Lozada      Clearance for Discharge: Baby is cleared to discharge with MOB and FOB when medically cleared     Ongoing Plan: will continue to follow

## 2024-02-06 NOTE — ANESTHESIA PREPROCEDURE EVALUATION
Case: [id number] Date/Time: 24    Procedure:  SECTION, PRIMARY (Abdomen)    Anesthesia type: Spinal    Pre-op diagnosis:  labor, breach    Location: [facility] OR 01 / SURGERY LABOR AND DELIVERY    Surgeons: Gregory Villaseñor M.D.           @ 29w4d, IUP, [place]  Premature Labor, Breech Presentation  Relevant Problems   Other   (positive) Labor and delivery, indication for care       Physical Exam    Airway   Mallampati: II  TM distance: >3 FB  Neck ROM: full       Cardiovascular - normal exam  Rhythm: regular  Rate: normal  (-) murmur     Dental - normal exam           Pulmonary - normal exam  Breath sounds clear to auscultation     Abdominal    Neurological - normal exam                   Anesthesia Plan    ASA 2- EMERGENT (Premature Labor, Breech Presentation)   ASA physical status emergent criteria: other (comment)    Plan - spinal   Neuraxial block will be primary anesthetic                Postoperative Plan: Postoperative administration of opioids is intended.    Pertinent diagnostic labs and testing reviewed    Informed Consent:    Anesthetic plan and risks discussed with patient.

## 2024-02-06 NOTE — OR SURGEON
Immediate Post OP Note    PreOp Diagnosis: Intrauterine pregnancy at 29 weeks 4-day gestational age, advanced cervical dilation, labor, breech presentation, desires sterilization      PostOp Diagnosis: Same      Procedure(s):   SECTION, PRIMARY, bilateral salpingectomy    Surgeon(s):  Gregory Villaseñor M.D.    Assistant:  Tiffany Mary CNM    Anesthesiologist/Type of Anesthesia:  Anesthesiologist: Erik Barreto D.O./Spinal    Surgical Staff:  * No surgical staff found *    Specimens removed if any:  * No specimens in log *    Estimated Blood Loss: 600 mL    Findings: Female infant, Apgars of 3, 6 and 7.  Weight pending.  None placenta with three-vessel cord.  Bilateral normal tubes and ovaries    Complications: None        2024 12:00 AM Gregory Villaseñor M.D.

## 2024-02-06 NOTE — PROGRESS NOTES
1900- Repot received from Mckayla SAUER. Care assumed.  Pt declines VB, CTX, pain. Pt reports positive FM.      1920- Pt placed on EFM/[place].     2123- RN asked Charge RN to review strip.     2125-Dr. Villaseñor called and given report and asked to review Pt's strip.Orders received for SVE    2135- SVE by Angelo    2141- Norbert SAUER, requested to come to bedside to check pt. SVE.  SVE verified (6 cm with BBOW).    2150- Dr. Villaseñor called to bedside to assess pt in person. R/R of C/S discussed, Pt consents to procedure. See surgical notes.    0132- Pt brought up to postpartum unit. Report given to Alethea SAUER, bleeding and orders verified. Care trasferred

## 2024-02-06 NOTE — ANESTHESIA POSTPROCEDURE EVALUATION
Patient: Tiffanie Lujan    Procedure Summary       Date: 24 Room / Location: [facility] OR 01 / SURGERY LABOR AND DELIVERY    Anesthesia Start:  Anesthesia Stop:     Procedure:  SECTION, PRIMARY (Abdomen) Diagnosis: ( labor, breach)    Surgeons: Gregory Villaseñor M.D. Responsible Provider: Erik Barreto D.O.    Anesthesia Type: spinal ASA Status: 2 - Emergent            Final Anesthesia Type: spinal  Last vitals  BP   Blood Pressure: 108/61    Temp   36.7 °C (98 °F)    Pulse   (!) 105   Resp   15    SpO2   96 %      Anesthesia Post Evaluation    Patient location during evaluation: PACU  Patient participation: complete - patient participated  Level of consciousness: awake and alert  Pain score: 2    Airway patency: patent  Anesthetic complications: no  Cardiovascular status: hemodynamically stable  Respiratory status: acceptable  Hydration status: euvolemic    PONV: none          No notable events documented.     Nurse Pain Score: 3 (NPRS)

## 2024-02-06 NOTE — PROGRESS NOTES
Received patient from labor and delivery via gurney with Sarita SAUER.  Assessment done. Fundus firm. Lochia light. Instructed patient to increase fluid intake and to watch for increased bleeding. Patient denies pain at this time. SCD's, rueda catheter, SpO2 monitor are on. Reviewed POC including bed rest, ambulation, pain medications, pain scale, and using the breast pump. Baby is in NICU. Call light within reach.

## 2024-02-06 NOTE — CARE PLAN
Problem: Altered Physiologic Condition  Goal: Patient physiologically stable as evidenced by normal lochia, palpable uterine involution and vitals within normal limits  Outcome: Progressing     Problem: Respiratory/Oxygenation Function Post-Surgical  Goal: Patient will achieve/maintain normal respiratory rate/effort  Outcome: Progressing   The patient is Stable - Low risk of patient condition declining or worsening    Shift Goals  Clinical Goals: lochia WDL; pain control  Patient Goals: Remain pregnant, receive TDAP vaccine  Family Goals: Support    Progress made toward(s) clinical / shift goals:  pt lochia remains WDL. Pt is receiving pain medication as scheduled. Pt is comfortable at this time. Dura checks will continue until 1500 today.     Patient is not progressing towards the following goals:

## 2024-02-06 NOTE — CARE PLAN
The patient is Stable - Low risk of patient condition declining or worsening    Shift Goals  Clinical Goals: Maintain fundus firm, lochia light; pain management; pt to ambulate  Patient Goals: visit her infant in NICU  Family Goals: Support    Progress made toward(s) clinical / shift goals:  Fundus firm, lochia scant; pt reported pain relief with scheduled pain medication; pt ambulated to bathroom with steady gait.  Patient able to visit her infant in the NICU.

## 2024-02-06 NOTE — ANESTHESIA TIME REPORT
Outpatient Physical Therapy Ortho Treatment Note  [facility]     Patient Name: Maren Heredia  : 1961  MRN: [id number]  Today's Date: 2018      Visit Date: 2018  Visits 3/3. Demian . MD f/u DAVID. % improvement.   Visit Dx:    ICD-10-CM ICD-9-CM   1. Chronic bilateral low back pain with left-sided sciatica M54.42 724.2    G89.29 724.3     338.29       Patient Active Problem List   Diagnosis   • Essential hypertension   • Otorrhea of right ear   • History of laparoscopic adjustable gastric banding   • Acute renal failure syndrome (CMS/HCC)   • Dizziness   • Breast cancer screening   • Osteoporosis screening   • Encounter for pelvic screening for cancer   • Insomnia   • History of endometrial cancer   • Chronic midline low back pain without sciatica   • Colon cancer screening   • Screening for malignant neoplasm of colon        Past Medical History:   Diagnosis Date   • Acute renal failure syndrome (CMS/HCC)    • Anxiety    • Chronic back pain    • Depression    • Drug therapy    • Dysuria    • Encounter for gynecological examination (general) (routine) without abnormal findings    • Encounter for screening mammogram for malignant neoplasm of breast    • Endometrial cancer (CMS/HCC)    • Hematuria, unspecified    • History of laparoscopic adjustable gastric banding    • History of malignant neoplasm of ovary    • History of malignant neoplasm of uterine body    • Hypertension    • Hypertensive disorder    • Morbid obesity (CMS/HCC)    • Preprocedural cardiovascular examination     Cardiovascular examination and evaluation      • Rash         Past Surgical History:   Procedure Laterality Date   • BARIATRIC SURGERY     • COLONOSCOPY N/A 2018    Procedure: COLONOSCOPY;  Surgeon: Shivam Borges MD;  Location: [facility] ENDOSCOPY;  Service: Gastroenterology   • CREATE / REPLACE / REMOVE CSF SHUNT     • TOTAL ABDOMINAL HYSTERECTOMY WITH SALPINGO OOPHORECTOMY  2009  Anesthesia Start and Stop Event Times       Date Time Event    2/5/2024 2216 Ready for Procedure     2255 Anesthesia Start     2357 Anesthesia Stop          Responsible Staff  02/05/24      Name Role Begin End    Erik Barreto D.O. Anesth [id number]          Overtime Reason:  no overtime (within assigned shift)    Comments:                                                           BSO                             PT Assessment/Plan     Row Name 07/26/18 1600          PT Assessment    Assessment Comments Good butch of today's treatment. She continues to benefit from PT for further progression towards goals.   -JW        PT Plan    PT Frequency 2x/week  -JW     Predicted Duration of Therapy Intervention (Therapy Eval) 4 weeks  -JW     PT Plan Comments Cont PT per POC.  -JW       User Key  (r) = Recorded By, (t) = Taken By, (c) = Cosigned By    Initials Name Provider Type    RADHA Guerrero PTA Physical Therapy Assistant                    Exercises     Row Name 07/26/18 1600             Subjective Comments    Subjective Comments Pt c/o localized LBP.   -JW         Subjective Pain    Pre-Treatment Pain Level 4  -JW      Post-Treatment Pain Level 5  -JW         Exercise 1    Exercise Name 1 AQ: fwd/rev amb  -JW      Time 1 3'  -JW         Exercise 2    Exercise Name 2 AQ: sidestep  -JW      Time 2 3'  -JW         Exercise 3    Exercise Name 3 AQ: marching  -JW      Time 3 2'  -JW         Exercise 4    Exercise Name 4 AQ: MS  -JW      Reps 4 20  -JW         Exercise 5    Exercise Name 5 AQ: HS curls  -JW      Reps 5 20  -JW         Exercise 6    Exercise Name 6 AQ: CR/TR  -JW      Reps 6 20  -JW         Exercise 7    Exercise Name 7 AQ: hip 3-way  -JW      Reps 7 20x ea  -JW         Exercise 8    Exercise Name 8 AQ: trunk twist with bar  -JW      Reps 8 20  -JW         Exercise 9    Exercise Name 9 AQ: shld 3-way  -JW      Reps 9 20x ea  -JW      Additional Comments yellow paddles  -JW         Exercise 10    Exercise Name 10 AQ: deep hang scissors  -JW      Time 10 2'  -JW         Exercise 11    Exercise Name 11 AQ: deep hang  -JW      Time 11 3'  -JW        User Key  (r) = Recorded By, (t) = Taken By, (c) = Cosigned By    Initials Name Provider Type    RADHA Guerrero PTA Physical Therapy Assistant                               PT OP Goals     Row Name 07/26/18 1614 07/26/18 1600       PT  Short Term Goals    STG Date to Achieve  -- 07/31/18  -    STG 1  -- Pt indep with pool HEP  -    STG 1 Progress  -- New  -    STG 2  -- Improve lumbar spine flex to WFL-fingers to toes  -    STG 2 Progress  -- New  -    STG 3  -- Improve R LE MMT by 1/2-1 muscle grade  -    STG 3 Progress  -- New  -    STG 4  -- Reduce LBP by 25% with standing and walking  -    STG 4 Progress  -- New  -       Long Term Goals    LTG Date to Achieve  -- 08/14/18  -    LTG 1  -- Reduce LBP by 50% with standing and walking  -    LTG 1 Progress  -- New  -    LTG 2  -- Improve bilat hamstrings flexibility to > or =75° w/ 90/90 SLR   -    LT 2 Progress  -- New  -       Time Calculation    PT Goal Re-Cert Due Date 08/07/18  -  --      User Key  (r) = Recorded By, (t) = Taken By, (c) = Cosigned By    Initials Name Provider Type     Tyrel Guerrero PTA Physical Therapy Assistant                         Time Calculation:   Start Time: 1515  Stop Time: 1555  Time Calculation (min): 40 min  Total Timed Code Minutes- PT: 40 minute(s)  Therapy Suggested Charges     Code   Minutes Charges    None           Therapy Charges for Today     Code Description Service Date Service Provider Modifiers Qty    [id number] HC PT THER PROC EA 15 MIN 7/26/2018 Tyrel Guerrero PTA GP 3                    Tyrel Guerrero PTA  7/26/2018

## 2024-02-06 NOTE — PROGRESS NOTES
[facility] progress note, POD1    S: Patient delivered overnight after being found to be in PTL. C/S uncomplicated. Patient w/o complaints. Pain controlled. Humphrey in place and not ambulating yet. Baby well in NICU per patient.     O:  Vitals:    02/06/24 0553   BP: 106/69   Pulse: 62   Resp: 16   Temp: 36.6 °C (97.9 °F)   SpO2: 96%     Gen: A&O, NAD  CV: RRR  Resp: non-labored  Abd: Soft, NT. Fundus at umbilicus.   Inc: Dressing with some blood, not extending beyond marked area    Hg 11.4    A/P: POD1 s/p pLTCS and bilateral salpingectomy  -meeting appropirate post-op goals  -continue routine care    Eva Lee M.D.

## 2024-02-06 NOTE — PROGRESS NOTES
Late entry due to patient care     2141 requested at bedside by primary RN for SVE. Pt consented for SVE using  #[id number]. SVE as charted. Provider updated.

## 2024-02-07 PROCEDURE — A9270 NON-COVERED ITEM OR SERVICE: HCPCS | Performed by: OBSTETRICS & GYNECOLOGY

## 2024-02-07 PROCEDURE — 700102 HCHG RX REV CODE 250 W/ 637 OVERRIDE(OP): Performed by: ANESTHESIOLOGY

## 2024-02-07 PROCEDURE — A9270 NON-COVERED ITEM OR SERVICE: HCPCS | Performed by: NURSE PRACTITIONER

## 2024-02-07 PROCEDURE — 700102 HCHG RX REV CODE 250 W/ 637 OVERRIDE(OP): Performed by: STUDENT IN AN ORGANIZED HEALTH CARE EDUCATION/TRAINING PROGRAM

## 2024-02-07 PROCEDURE — 700102 HCHG RX REV CODE 250 W/ 637 OVERRIDE(OP): Performed by: OBSTETRICS & GYNECOLOGY

## 2024-02-07 PROCEDURE — A9270 NON-COVERED ITEM OR SERVICE: HCPCS | Performed by: ANESTHESIOLOGY

## 2024-02-07 PROCEDURE — A9270 NON-COVERED ITEM OR SERVICE: HCPCS | Performed by: STUDENT IN AN ORGANIZED HEALTH CARE EDUCATION/TRAINING PROGRAM

## 2024-02-07 PROCEDURE — 700102 HCHG RX REV CODE 250 W/ 637 OVERRIDE(OP): Performed by: NURSE PRACTITIONER

## 2024-02-07 PROCEDURE — 770002 HCHG ROOM/CARE - OB PRIVATE (112)

## 2024-02-07 RX ORDER — SIMETHICONE 125 MG
125 TABLET,CHEWABLE ORAL 4 TIMES DAILY PRN
Status: DISCONTINUED | OUTPATIENT
Start: 2024-02-07 | End: 2024-02-09 | Stop reason: HOSPADM

## 2024-02-07 RX ORDER — SIMETHICONE 125 MG
125 TABLET,CHEWABLE ORAL 4 TIMES DAILY PRN
Status: DISCONTINUED | OUTPATIENT
Start: 2024-02-07 | End: 2024-02-07

## 2024-02-07 RX ADMIN — ACETAMINOPHEN 1000 MG: 500 TABLET ORAL at 05:40

## 2024-02-07 RX ADMIN — ACETAMINOPHEN 1000 MG: 500 TABLET ORAL at 12:03

## 2024-02-07 RX ADMIN — IBUPROFEN 800 MG: 800 TABLET, FILM COATED ORAL at 16:31

## 2024-02-07 RX ADMIN — IBUPROFEN 800 MG: 800 TABLET, FILM COATED ORAL at 07:49

## 2024-02-07 RX ADMIN — SIMETHICONE 125 MG: 125 TABLET, CHEWABLE ORAL at 22:02

## 2024-02-07 RX ADMIN — ACETAMINOPHEN 1000 MG: 500 TABLET ORAL at 18:02

## 2024-02-07 RX ADMIN — POLYETHYLENE GLYCOL 3350 1 PACKET: 17 POWDER, FOR SOLUTION ORAL at 20:00

## 2024-02-07 ASSESSMENT — PAIN DESCRIPTION - PAIN TYPE
TYPE: SURGICAL PAIN
TYPE: SURGICAL PAIN

## 2024-02-07 NOTE — PROGRESS NOTES
Obstetrics & Gynecology Post-Delivery Progress Note    Date of Service      28 y.o.  s/p  for breech   Delivery date: 2024          Subjective  Pt reports no acute concerns today. No pain at incision site, but deeper abdominal pain at site of surgery, improved from yesterday.   Pain: Yes,  controlled  Bleeding: lochia less than regular menstrual bleeding  Tolerating PO: Yes  Voiding: without difficulty  Ambulating: yes  Passing flatus: Yes  Feeding: breastfeeding well      Objective  24hr VS:  Temp:  [36.7 °C (98 °F)-37.3 °C (99.2 °F)] 36.7 °C (98 °F)  Pulse:  [] 95  Resp:  [16-20] 18  BP: (103-121)/(70-86) 110/70  SpO2:  [93 %-96 %] 95 %    Physical Exam  Gen: well and resting  CV: RRR, nl S1 and S2, no murmur  Resp: unlabored respirations, no intercostal retractions or accessory muscle use, clear to auscultation without rales or wheezes  Chest/Breasts: exam deferred  Abd: nontender, normal bowel sounds, soft  Fundus: firm, below umbilicus, and nontender  Incision: dressing clean, dry, intact  Perineum: deferred  Ext: symmetric and no edema, calves nontender    Labs:  Recent Labs     24  0723 24  1208 24  0714   WBC 15.0* 16.8* 17.6*   RBC 3.79* 3.86* 3.69*   HEMOGLOBIN 11.7* 12.1 11.4*   HEMATOCRIT 33.9* 35.4* 33.9*   MCV 89.4 91.7 91.9   MCH 30.9 31.3 30.9   RDW 43.9 45.1 45.5   PLATELETCT 210 233 203   MPV 11.4 11.6 11.7   NEUTSPOLYS 83.30* 82.10*  --    LYMPHOCYTES 12.60* 12.00*  --    MONOCYTES 3.20 4.80  --    EOSINOPHILS 0.30 0.40  --    BASOPHILS 0.10 0.20  --         Medications  acetaminophen, 1,000 mg, Oral, Q6HRS  ibuprofen, 800 mg, Oral, Q8HRS      PRN medications: LR, acetaminophen **FOLLOWED BY** [START ON 2/10/2024] acetaminophen, oxyCODONE immediate-release, oxyCODONE immediate release, ondansetron **OR** ondansetron, diphenhydrAMINE **OR** diphenhydrAMINE, docusate sodium, ibuprofen **FOLLOWED BY** [START ON 2024] ibuprofen, polyethylene  glycol/lytes, calcium carbonate      Assessment/Plan  Tiffanie Lujan is a 28 y.o.  postpartum day #2  s/p  for breech     - Post care: meeting all goals  - Pain: controlled   - Rh+, Rubella Immune  - Method of Feeding: plans to breastfeed  - Method of Contraception: tubal ligation (already done)  VTE prophylaxis: patient ambulating    - Disposition: likely home PPD 4; baby in NICU        Freeman Marquis M.D.   PGY-1  [facility]

## 2024-02-07 NOTE — PROGRESS NOTES
Bedside report received from Tonya DIAZ RN. Patient care assumed. Chart, prenatal labs, and orders reviewed  Patient assessment complete and WDL. Fundus firm and lochia scant. Patient ambulating to bathroom and voiding without difficulty. Patient denies any dizziness/lightheadedness or calf pain/tenderness. Patient also denies any chest pain, difficulty breathing or SOB. Plan of care discussed with patient for the day including pumping every 3 hours, pain management, and ambulation in halls. Pain medication plan discussed with patient; reviewed scheduled pain medications and patient states she will call if PRN pain medication is wanted. All questions/concerns addressed at this time. Call light within reach, encouraged to call with needs. Will continue with routine postpartum cares.

## 2024-02-07 NOTE — LACTATION NOTE
Follow-up LC visit, FOB prefers to translate for Egyptian language for consultation. Mother is pumping routinely, reviewed pump use and settings, mother reports pumping feels comfortable, removing drops of colostrum. Discussed use of hand expression after pumping to help establish production, mother reports she was taught technique yesterday by LC. Reviewed washing of pump parts and collection and storage of breast milk. Planning to stay at [facility], discussed rental pump versus personal pump.  Plan to pump and express breasts at least 8 times each 24 hours. Parents deny questions/concerns.

## 2024-02-07 NOTE — LACTATION NOTE
FOB interpreting per patient preference.     MOB is a 27 y/o  with this infant delivering @29 4/7 gestation inner cervical thinning and breech. Infant was transferred to NICU for  supported care.     MOB pumping upon arrival in room. Review setting, schedule and cleaning of parts. MOB is getting drops to a couple of mls with each pumping. Teach to continue pumping every 3 hours and follow with 2-5 minutes of hand expression. Demo/return demo of hand expression with drops seen. Teach emotional techniques like look at baby's picture and covering bottles with baby sock to support milk release. [employer] web link discussed and teach to watch very early baby video. [place] [facility] given as a supportive resource.     Teach where to rent HG pump and to take all kit parts when discharged from hospital. Teach to ask to a pumping kit in NICU so as to pump @infant's bedside. Family voices understanding of info.      Family lives in [place], and will be staying @the [facility] following MOB's discharge. Outpatient lactation resource list given with notation of outlying area lactation resources.

## 2024-02-07 NOTE — PROGRESS NOTES
Assumed care of patient, report at bedside from, Zoltan SAUER. Assessment completed and WDL. Call light within reach, discussed plan of care, denies pain at the moment. Will continue to monitor.

## 2024-02-07 NOTE — PROGRESS NOTES
M progress note, POD2    S: Patient w/o complaints. Pain controlled. Voiding. + ambulating. +flatus, no BM. Minimal lochia. Baby well in NICU per patient.     O:  Vitals:    02/07/24 0600   BP: 110/70   Pulse: 95   Resp: 18   Temp: 36.7 °C (98 °F)   SpO2: 95%     Gen: A&O, NAD  CV: RRR  Resp: non-labored  Abd: Soft, NT. Fundus at umbilicus.   Inc: Dressing with some blood, not extending beyond marked area    Hg 11.4    A/P: POD2 s/p pLTCS and bilateral salpingectomy  -meeting appropirate post-op goals  -continue routine care  -patient desires POD4 discharge, will be staying at the [facility]    Eva Lee M.D.

## 2024-02-08 PROCEDURE — 700102 HCHG RX REV CODE 250 W/ 637 OVERRIDE(OP): Performed by: ANESTHESIOLOGY

## 2024-02-08 PROCEDURE — 700102 HCHG RX REV CODE 250 W/ 637 OVERRIDE(OP): Performed by: STUDENT IN AN ORGANIZED HEALTH CARE EDUCATION/TRAINING PROGRAM

## 2024-02-08 PROCEDURE — 700102 HCHG RX REV CODE 250 W/ 637 OVERRIDE(OP): Performed by: NURSE PRACTITIONER

## 2024-02-08 PROCEDURE — A9270 NON-COVERED ITEM OR SERVICE: HCPCS | Performed by: NURSE PRACTITIONER

## 2024-02-08 PROCEDURE — A9270 NON-COVERED ITEM OR SERVICE: HCPCS | Performed by: ANESTHESIOLOGY

## 2024-02-08 PROCEDURE — 700102 HCHG RX REV CODE 250 W/ 637 OVERRIDE(OP): Performed by: OBSTETRICS & GYNECOLOGY

## 2024-02-08 PROCEDURE — 770002 HCHG ROOM/CARE - OB PRIVATE (112)

## 2024-02-08 PROCEDURE — A9270 NON-COVERED ITEM OR SERVICE: HCPCS | Performed by: STUDENT IN AN ORGANIZED HEALTH CARE EDUCATION/TRAINING PROGRAM

## 2024-02-08 PROCEDURE — A9270 NON-COVERED ITEM OR SERVICE: HCPCS | Performed by: OBSTETRICS & GYNECOLOGY

## 2024-02-08 RX ADMIN — OXYCODONE 5 MG: 5 TABLET ORAL at 21:02

## 2024-02-08 RX ADMIN — ACETAMINOPHEN 1000 MG: 500 TABLET ORAL at 19:55

## 2024-02-08 RX ADMIN — ACETAMINOPHEN 1000 MG: 500 TABLET ORAL at 06:23

## 2024-02-08 RX ADMIN — SIMETHICONE 125 MG: 125 TABLET, CHEWABLE ORAL at 18:04

## 2024-02-08 RX ADMIN — ACETAMINOPHEN 1000 MG: 500 TABLET ORAL at 13:26

## 2024-02-08 RX ADMIN — SIMETHICONE 125 MG: 125 TABLET, CHEWABLE ORAL at 06:25

## 2024-02-08 RX ADMIN — POLYETHYLENE GLYCOL 3350 1 PACKET: 17 POWDER, FOR SOLUTION ORAL at 13:26

## 2024-02-08 RX ADMIN — IBUPROFEN 800 MG: 800 TABLET, FILM COATED ORAL at 17:58

## 2024-02-08 RX ADMIN — IBUPROFEN 800 MG: 800 TABLET, FILM COATED ORAL at 09:40

## 2024-02-08 RX ADMIN — ACETAMINOPHEN 1000 MG: 500 TABLET ORAL at 00:42

## 2024-02-08 RX ADMIN — IBUPROFEN 800 MG: 800 TABLET, FILM COATED ORAL at 00:41

## 2024-02-08 ASSESSMENT — PAIN DESCRIPTION - PAIN TYPE: TYPE: SURGICAL PAIN

## 2024-02-08 NOTE — CARE PLAN
Problem: Altered Physiologic Condition  Goal: Patient physiologically stable as evidenced by normal lochia, palpable uterine involution and vitals within normal limits  Outcome: Progressing     Problem: Bowel Elimination - Post Surgical  Goal: Patient will resume regular bowel sounds and function with no discomfort or distention  Outcome: Progressing     Problem: Early Mobilization - Post Surgery  Goal: Early mobilization post surgery  Outcome: Progressing   The patient is Stable - Low risk of patient condition declining or worsening    Shift Goals  Clinical Goals: lochia WDL; ambulation; pain control  Patient Goals: visit her infant in NICU  Family Goals: Support    Progress made toward(s) clinical / shift goals:  pt lochia is scant and remains WDL. Pt has been ambulating in the room and in the hallways this evening. Simethicone was ordered tonight to help with gas pain. Pt pain has been managed with scheduled pain medicine and rest.     Patient is not progressing towards the following goals:

## 2024-02-08 NOTE — PROGRESS NOTES
MFM progress note, POD3    S: No complaints. Pain controlled. Voiding. + ambulating. +flatus, +BM yesterday. Minimal lochia. Baby well in NICU per patient. Getting milk now when she pumps.    O:  Vitals:    02/08/24 0536   BP: 115/72   Pulse: 96   Resp: 18   Temp: 36.7 °C (98 °F)   SpO2: 96%     Gen: A&O, NAD  CV: RRR  Resp: non-labored  Abd: Soft, NT. Fundus at umbilicus.   Inc: Dressing with some blood, not extending beyond marked area    Hg 11.4    A/P: POD3 s/p pLTCS and bilateral salpingectomy  -meeting appropriate post-op goals  -continue routine care  -patient desires POD4 discharge, will be staying at the [facility]    Eva Lee M.D.

## 2024-02-08 NOTE — CARE PLAN
The patient is Stable - Low risk of patient condition declining or worsening  Shift Goals  Clinical Goals: pain mangement, ambulation, stable VS  Patient Goals: visit her infant in NICU  Family Goals: Support    Progress made toward(s) clinical / shift goals:  Pain is well controlled with scheduled pain medications, ambulating to NICU occasionally and stable VS    Patient is not progressing towards the following goals: N/A

## 2024-02-08 NOTE — PROGRESS NOTES
Post Partum Progress Note    Name:   Tiffanie Lujan   Date/Time:  2024 - 6:14 AM  Chief Admitting Dx:  Labor and delivery, indication for care [O75.9]  Labor and delivery indication for care or intervention [O75.9]  Delivery Type:   for breech, PTL with advanced cervical dilation  Post-Op/Post Partum Days #:  3    Subjective:  Abdominal pain: no  Ambulating:   yes  Tolerating liquids:  yes  Tolerating food:  yes common adult  Flatus:   yes  BM:    yes  Bleeding:   with a small amount of bleeding  Voiding:   yes  Dizziness:   no  Feeding:   Pumping, baby in NICU    Vitals:    24 1800 24 0600 24 1802 24 0536   BP: 103/71 110/70 109/76 115/72   Pulse: 91 95 (!) 101 96   Resp: 18 18 20 18   Temp: 37.1 °C (98.7 °F) 36.7 °C (98 °F) 37.3 °C (99.2 °F) 36.7 °C (98 °F)   TempSrc: Temporal Temporal Temporal Temporal   SpO2: 96% 95% 98% 96%   Weight:       Height:           Exam:  Breast: Tenderness no  Abdomen: Abdomen soft, non-tender. BS normal. No masses,  No organomegaly  Fundal Tenderness:  no  Fundus Firm: yes  Incision: dry and intact  Below umbilicus: yes  Perineum: perineum intact  Lochia: mild  Extremities: Normal extremities, peripheral pulses and reflexes normal    Meds:  Current Facility-Administered Medications   Medication Dose    simethicone (Mylicon) chewable tablet 125 mg  125 mg    lactated ringers infusion      acetaminophen (Tylenol) tablet 1,000 mg  1,000 mg    Followed by    [START ON 2/10/2024] acetaminophen (Tylenol) tablet 1,000 mg  1,000 mg    oxyCODONE immediate-release (Roxicodone) tablet 5 mg  5 mg    oxyCODONE immediate release (Roxicodone) tablet 10 mg  10 mg    ondansetron (Zofran) syringe/vial injection 4 mg  4 mg    Or    ondansetron (Zofran ODT) dispertab 4 mg  4 mg    diphenhydrAMINE (Benadryl) tablet/capsule 25 mg  25 mg    Or    diphenhydrAMINE (Benadryl) injection 25 mg  25 mg    docusate sodium (Colace) capsule 100 mg  100 mg    ibuprofen  (Motrin) tablet 800 mg  800 mg    Followed by    [START ON 2024] ibuprofen (Motrin) tablet 800 mg  800 mg    polyethylene glycol/lytes (Miralax) Packet 1 Packet  1 Packet    oxytocin (Pitocin) infusion (for post delivery)  125 mL/hr    calcium carbonate (Tums) chewable tab 500 mg  500 mg       Labs:   Recent Labs     24  1208 24  0714   WBC 16.8* 17.6*   RBC 3.86* 3.69*   HEMOGLOBIN 12.1 11.4*   HEMATOCRIT 35.4* 33.9*   MCV 91.7 91.9   MCH 31.3 30.9   MCHC 34.2 33.6   RDW 45.1 45.5   PLATELETCT 233 203   MPV 11.6 11.7       Assessment:  Chief Admitting Dx:  Labor and delivery, indication for care [O75.9]  Labor and delivery indication for care or intervention [O75.9]  Delivery Type:   for breech, PTL with advanced cervical dilation  Tubal Ligation:  yes    Plan:  Continue routine post partum care.  Plan for discharge on POD#4, will be staying at [facility] until baby's discharge      TERESA Navarro.

## 2024-02-08 NOTE — PROGRESS NOTES
Report received from Jessie SAUER. Pt assessment complete. Reviewed POC for this evening including scheduled pain medication. Call light is within reach.

## 2024-02-08 NOTE — PROGRESS NOTES
Assessment completed WDL. Pt states pain is well controlled with current pain medications. Plan of care discussed and pt instructed to call with needs.

## 2024-02-08 NOTE — LACTATION NOTE
This note was copied from a baby's chart.  Follow up lactation support : LC spoke with mom regarding pumping. LC reviewed settings. Mom denies any discomfort. LC discussed supply and demand and importance of routine stimulation  Mom states she has been pumping Q 3 hours. Family lives in [place] and will call the [facility] office to enroll and have acces to a HG pump. Mom bought an electric pump to use as needed. SIMON recommended a HG for for first two weeks before switching over to her personal pump.  Mom also know she can use her kit for pumping at the baby's bedside. SIMON discussed diet with mom .  Settings were written on white board for mom.  No further questions or concerns from mom.  FERNANDO Fink and SUMEET assisted with translation

## 2024-02-09 ENCOUNTER — PHARMACY VISIT (OUTPATIENT)
Dept: PHARMACY | Facility: MEDICAL CENTER | Age: 29
End: 2024-02-09
Payer: COMMERCIAL

## 2024-02-09 VITALS
HEIGHT: 65 IN | HEART RATE: 93 BPM | WEIGHT: 167.99 LBS | TEMPERATURE: 98 F | RESPIRATION RATE: 18 BRPM | BODY MASS INDEX: 27.99 KG/M2 | OXYGEN SATURATION: 98 % | SYSTOLIC BLOOD PRESSURE: 113 MMHG | DIASTOLIC BLOOD PRESSURE: 84 MMHG

## 2024-02-09 PROCEDURE — 700102 HCHG RX REV CODE 250 W/ 637 OVERRIDE(OP): Performed by: ANESTHESIOLOGY

## 2024-02-09 PROCEDURE — A9270 NON-COVERED ITEM OR SERVICE: HCPCS | Performed by: OBSTETRICS & GYNECOLOGY

## 2024-02-09 PROCEDURE — 700102 HCHG RX REV CODE 250 W/ 637 OVERRIDE(OP): Performed by: NURSE PRACTITIONER

## 2024-02-09 PROCEDURE — A9270 NON-COVERED ITEM OR SERVICE: HCPCS | Performed by: NURSE PRACTITIONER

## 2024-02-09 PROCEDURE — A9270 NON-COVERED ITEM OR SERVICE: HCPCS | Performed by: ANESTHESIOLOGY

## 2024-02-09 PROCEDURE — RXMED WILLOW AMBULATORY MEDICATION CHARGE

## 2024-02-09 PROCEDURE — 700102 HCHG RX REV CODE 250 W/ 637 OVERRIDE(OP): Performed by: OBSTETRICS & GYNECOLOGY

## 2024-02-09 RX ORDER — PSEUDOEPHEDRINE HCL 30 MG
100 TABLET ORAL 2 TIMES DAILY PRN
Qty: 60 CAPSULE | Refills: 0 | Status: SHIPPED | OUTPATIENT
Start: 2024-02-09

## 2024-02-09 RX ORDER — ACETAMINOPHEN 500 MG
1000 TABLET ORAL 3 TIMES DAILY PRN
Qty: 60 TABLET | Refills: 0 | Status: SHIPPED | OUTPATIENT
Start: 2024-02-09

## 2024-02-09 RX ORDER — OXYCODONE HYDROCHLORIDE 5 MG/1
5 TABLET ORAL EVERY 4 HOURS PRN
Qty: 10 TABLET | Refills: 0 | Status: SHIPPED | OUTPATIENT
Start: 2024-02-09 | End: 2024-02-19

## 2024-02-09 RX ORDER — IBUPROFEN 600 MG/1
600 TABLET ORAL EVERY 6 HOURS PRN
Qty: 60 TABLET | Refills: 0 | Status: SHIPPED | OUTPATIENT
Start: 2024-02-09

## 2024-02-09 RX ADMIN — IBUPROFEN 800 MG: 800 TABLET, FILM COATED ORAL at 09:57

## 2024-02-09 RX ADMIN — ACETAMINOPHEN 1000 MG: 500 TABLET ORAL at 01:52

## 2024-02-09 RX ADMIN — DOCUSATE SODIUM 100 MG: 100 CAPSULE, LIQUID FILLED ORAL at 05:44

## 2024-02-09 RX ADMIN — SIMETHICONE 125 MG: 125 TABLET, CHEWABLE ORAL at 10:03

## 2024-02-09 RX ADMIN — OXYCODONE 5 MG: 5 TABLET ORAL at 05:44

## 2024-02-09 RX ADMIN — IBUPROFEN 800 MG: 800 TABLET, FILM COATED ORAL at 01:53

## 2024-02-09 RX ADMIN — ACETAMINOPHEN 1000 MG: 500 TABLET ORAL at 08:30

## 2024-02-09 ASSESSMENT — PAIN DESCRIPTION - PAIN TYPE
TYPE: SURGICAL PAIN;ACUTE PAIN
TYPE: SURGICAL PAIN;ACUTE PAIN

## 2024-02-09 NOTE — DISCHARGE SUMMARY
Discharge Summary:     Date of Admission: 2024  Date of Discharge: 24      Admitting diagnosis:    1. Pregnancy @ 29w4d  2. Concern for PPROM  3.  labor  4. Desires sterilization      Discharge Diagnosis:   1. Status post  for breech on 2024 .  2. Bilateral Salpingectomy    History reviewed. No pertinent past medical history.  OB History    Para Term  AB Living   3 3 2 1   3   SAB IAB Ectopic Molar Multiple Live Births           0 1      # Outcome Date GA Lbr Colin/2nd Weight Sex Delivery Anes PTL Lv   3  24 29w4d  1.322 kg (2 lb 14.6 oz) F CS-LTranv Spinal Y EMILEE   2 Term            1 Term              Past Surgical History:   Procedure Laterality Date    PRIMARY C SECTION N/A 2024    Procedure:  SECTION, PRIMARY;  Surgeon: Gregory Villaseñor M.D.;  Location: SURGERY LABOR AND DELIVERY;  Service: Obstetrics    OTHER      D&C, 40 days postpartum in        Allergies:  Patient has no known allergies.    Patient Active Problem List   Diagnosis   (none) - all problems resolved or deleted       Hospital Course:   Pt is a 28 y.o. now  who presented from [place] with concern for PPROM. She was seen by [facility] and medicated with latent abx with close monitoring. On 2024, she was found to be in  labor with cervical change. Due to breech presentation, delivery was performed via CS.    Spinal anesthesia was utilized with good effect on pain. Single female infant was delivered via LTCS at 29w4d. Apgars 4, 6, and 7 at 1, 5, and 10 minutes respectively.  ml.     Postpartum course notable for early ambulation, well managed pain, tolerance of diet, spontaneous voiding, and appropriate feeding of infant. She has remained afebrile and blood pressure has been well controlled. All maternal questions and concerns addressed.      Physical Exam:  Temp:  [36.7 °C (98 °F)] 36.7 °C (98 °F)  Pulse:  [93] 93  Resp:  [18] 18  BP: (113)/(84)  113/84  SpO2:  [98 %] 98 %  Physical Exam    Gen: well and resting  CV: RRR, nl S1 and S2, no murmur  Resp: unlabored respirations, no intercostal retractions or accessory muscle use, clear to auscultation without rales or wheezes  Chest/Breasts: exam deferred  Abd: nontender, normal bowel sounds, soft  Fundus: firm, below umbilicus, and nontender  Incision: healing well, no significant drainage, no dehiscence, and no significant erythema  Perineum: deferred  Ext: symmetric and no edema, calves nontender    Current Facility-Administered Medications   Medication Dose    simethicone (Mylicon) chewable tablet 125 mg  125 mg    lactated ringers infusion      acetaminophen (Tylenol) tablet 1,000 mg  1,000 mg    Followed by    [START ON 2/10/2024] acetaminophen (Tylenol) tablet 1,000 mg  1,000 mg    oxyCODONE immediate-release (Roxicodone) tablet 5 mg  5 mg    oxyCODONE immediate release (Roxicodone) tablet 10 mg  10 mg    ondansetron (Zofran) syringe/vial injection 4 mg  4 mg    Or    ondansetron (Zofran ODT) dispertab 4 mg  4 mg    diphenhydrAMINE (Benadryl) tablet/capsule 25 mg  25 mg    Or    diphenhydrAMINE (Benadryl) injection 25 mg  25 mg    docusate sodium (Colace) capsule 100 mg  100 mg    ibuprofen (Motrin) tablet 800 mg  800 mg    polyethylene glycol/lytes (Miralax) Packet 1 Packet  1 Packet    oxytocin (Pitocin) infusion (for post delivery)  125 mL/hr    calcium carbonate (Tums) chewable tab 500 mg  500 mg               Activity/ Discharge Instructions::   Discharge to home  Pelvic Rest x 6 weeks  No heavy lifting x4 weeks  Call or come to ED for: heavy vaginal bleeding, fever >100.4, severe abdominal pain, severe headache, chest pain, shortness of breath,  N/V, incisional drainage, or other concerns.    Contraception: tubal ligation (already done)     Follow up:  [facility] in 5 weeks for vaginal delivery; 1 week for incision check for  delivery.     [facility]  [address] [address]  [phone]  Follow up in 1 week(s)  for incision check    [facility] [address] [address]  [phone]  Follow up in 5 week(s)  For postpartum follow up.   Or follow up with your local doctor in [place].       No future appointments.     Discharge Meds:   Current Outpatient Medications   Medication Sig Dispense Refill    acetaminophen (TYLENOL) 500 MG Tab Take 2 Tablets by mouth 3 times a day as needed for Mild Pain or Moderate Pain. 60 Tablet 0    docusate sodium 100 MG Cap Take 100 mg by mouth 2 times a day as needed for Constipation. 60 Capsule 0    oxyCODONE immediate-release (ROXICODONE) 5 MG Tab Take 1 Tablet by mouth every four hours as needed for Severe Pain for up to 10 days. 10 Tablet 0    ibuprofen (MOTRIN) 600 MG Tab Take 1 Tablet by mouth every 6 hours as needed for Mild Pain or Moderate Pain. 60 Tablet 0       Freeman Marquis M.D.   PGY-1  [facility]

## 2024-02-09 NOTE — PROGRESS NOTES
0700- Received report from CRISTIANE Castaneda. Assumed care. 12 hour chart check, MAR and orders reviewed.      0830- Assessment complete. Fundus firm and palpable, lochia scant to light rubra. Pain management and interventions discussed with pt. SO at bedside and able to translate for RN and patient. Patient OK with SO translating instruction. POC discussed. All questions and concerns discussed. No further concerns.

## 2024-02-09 NOTE — LACTATION NOTE
Followup visit  MOB reports she is pumping every 3 hours and yielding about 9 ml now. Reviewed expected pump yields and importance of maintaining a regular and frequent pumping schedule.   Encouraged MOB to pump at bedside in NICU, and to use the HG pump at [facility] post d/c. She also has a Dr Wang personal pump.

## 2024-02-09 NOTE — PROGRESS NOTES
Discharge paperwork for infant and mom discussed at bedside. All questions answered. Follow-up appointments reviewed. Paperwork signed and dated at this time.    1145- Patient and SO discharged with escort off unit. All questions answered at this time.

## 2024-02-09 NOTE — PROGRESS NOTES
MFM progress note, POD4     S: No complaints. Pain controlled. Voiding. + ambulating. +flatus, +BM yesterday. Minimal lochia. Baby well in NICU per patient. Getting milk now when she pumps.     O:  2/08 0700  02/09 0659 02/09 0700  02/09 0838  Most Recent     Temperature (°C) 36.7        Pulse 93        Respiration 18        Blood Pressure 113/84        Pulse Oximetry (%) 98           Gen: A&O, NAD  CV: RRR  Resp: non-labored  Abd: Soft, NT. Fundus at umbilicus.   Inc: clean, dry with some ecchymosis       Hg 11.4     A/P: POD 4 s/p pLTCS and bilateral salpingectomy  -meeting appropriate post-op goals  -continue routine care  -Discharge home today, will be staying at the [facility]  -Follow-up incision check Dr Lee in 1-2 weeks    Eddie Reagan MD

## 2024-02-09 NOTE — DISCHARGE INSTRUCTIONS

## 2024-02-09 NOTE — PROGRESS NOTES
Assumed care of patient, report at bedside from, Mis SAUER. Assessment completed and WDL. Call light within reach, discussed plan of care, medicated for pain Will continue to monitor.

## 2024-03-11 ENCOUNTER — POST PARTUM (OUTPATIENT)
Dept: OBGYN | Facility: CLINIC | Age: 29
End: 2024-03-11
Payer: COMMERCIAL

## 2024-03-11 ENCOUNTER — HOSPITAL ENCOUNTER (OUTPATIENT)
Facility: MEDICAL CENTER | Age: 29
End: 2024-03-11
Attending: OBSTETRICS & GYNECOLOGY
Payer: COMMERCIAL

## 2024-03-11 DIAGNOSIS — Z12.4 CERVICAL CANCER SCREENING: ICD-10-CM

## 2024-03-11 DIAGNOSIS — N89.8 VAGINAL DISCHARGE: ICD-10-CM

## 2024-03-11 PROCEDURE — 87591 N.GONORRHOEAE DNA AMP PROB: CPT

## 2024-03-11 PROCEDURE — 87510 GARDNER VAG DNA DIR PROBE: CPT

## 2024-03-11 PROCEDURE — 87624 HPV HI-RISK TYP POOLED RSLT: CPT

## 2024-03-11 PROCEDURE — 88175 CYTOPATH C/V AUTO FLUID REDO: CPT

## 2024-03-11 PROCEDURE — 0503F POSTPARTUM CARE VISIT: CPT | Performed by: OBSTETRICS & GYNECOLOGY

## 2024-03-11 PROCEDURE — 87480 CANDIDA DNA DIR PROBE: CPT

## 2024-03-11 PROCEDURE — 87491 CHLMYD TRACH DNA AMP PROBE: CPT

## 2024-03-11 PROCEDURE — 87660 TRICHOMONAS VAGIN DIR PROBE: CPT

## 2024-03-11 ASSESSMENT — EDINBURGH POSTNATAL DEPRESSION SCALE (EPDS)
I HAVE LOOKED FORWARD WITH ENJOYMENT TO THINGS: AS MUCH AS I EVER DID
I HAVE BEEN ABLE TO LAUGH AND SEE THE FUNNY SIDE OF THINGS: AS MUCH AS I ALWAYS COULD
TOTAL SCORE: 6
I HAVE BLAMED MYSELF UNNECESSARILY WHEN THINGS WENT WRONG: YES, SOME OF THE TIME
THE THOUGHT OF HARMING MYSELF HAS OCCURRED TO ME: NEVER
THINGS HAVE BEEN GETTING ON TOP OF ME: NO, MOST OF THE TIME I HAVE COPED QUITE WELL
I HAVE BEEN SO UNHAPPY THAT I HAVE HAD DIFFICULTY SLEEPING: NOT AT ALL
I HAVE FELT SCARED OR PANICKY FOR NO GOOD REASON: NO, NOT AT ALL
I HAVE FELT SAD OR MISERABLE: NOT VERY OFTEN
I HAVE BEEN SO UNHAPPY THAT I HAVE BEEN CRYING: ONLY OCCASIONALLY
I HAVE BEEN ANXIOUS OR WORRIED FOR NO GOOD REASON: HARDLY EVER

## 2024-03-11 NOTE — PROGRESS NOTES
Pt is here for postpartum exam  Pharmacy verified.  Good Phone #: [phone]  C/S date: 02/05/2024  Breastfeeding.  Last Pap:Unknown  BCM:None  Pt states no other complaints for today.  EPDS:6

## 2024-03-11 NOTE — PROGRESS NOTES
Tiffanie Lujan is a 28 y.o.  who returns to clinic today for her 6 week post post op appointment after  performed at 29w4d on 24 for  labor and breech presentation.  She reports that since being seen last she has been doing well.  She is bonding well with her infant who is still in the NICU and she is pumping.  She has not yet had a menstral cycle.  She is not longer taking any pain medications.  She is participating in her usual activities but has followed the lifting precautions and has not yet resumed intercourse.  Denies breast complaints, abnormal vaginal discharge, urinary symptoms, bowel complaints, or other concerns at this time.    All other systems are reviewed and are negative.    PMH, PSH, SH, and FH reviewed with patient today - changes made in chart.    There were no vitals taken for this visit.  Breasts: no masses, engorgement, pain or erythema  CVS: RRR  Resp: CTA bilaterally  Abdomen: Abdomen soft, non-tender.  No masses,  No organomegaly  Incision: well healed without erythema or induration.   Extremities: no cyanosis, clubbing, no edema    No current facility-administered medications for this visit.       Lab: No results found for this or any previous visit (from the past 48 hour(s)).    [place]: 6    Tiffanie Lujan is a 28 y.o.  returns to clinic today for her 6 week post partum/post op appointmet after .    #post partum/post op.  Meeting all milestones.  Discussed she may resume intercourse and no longer has any lifting precautions  ?Breastfeeding.  Encouraged continuation of exclusive BF until at least 6 months.   #Cervical cancer screening.  Last pap unknown so performed today.     #Post partum depression score. 6  #Post partum birth control method: She was counselled about the various options including OCPs, patch, ring, IUDs, and implant.  --desires - had a tubal.  #Recommend follow up in a year for annual or sooner if issues or concerns  arise.  SMCM

## 2024-03-12 LAB
CANDIDA DNA VAG QL PROBE+SIG AMP: NEGATIVE
G VAGINALIS DNA VAG QL PROBE+SIG AMP: NEGATIVE
T VAGINALIS DNA VAG QL PROBE+SIG AMP: NEGATIVE

## 2024-03-14 ENCOUNTER — APPOINTMENT (OUTPATIENT)
Dept: OBGYN | Facility: CLINIC | Age: 29
End: 2024-03-14
Payer: COMMERCIAL

## 2024-03-19 LAB
C TRACH RRNA CVX QL NAA+PROBE: NEGATIVE
CYTOLOGIST CVX/VAG CYTO: NORMAL
CYTOLOGY CVX/VAG DOC CYTO: NORMAL
CYTOLOGY CVX/VAG DOC THIN PREP: NORMAL
HPV I/H RISK 4 DNA CVX QL PROBE+SIG AMP: NEGATIVE
N GONORRHOEA RRNA CVX QL NAA+PROBE: NEGATIVE
NOTE NL11727A: NORMAL
OTHER STN SPEC: NORMAL
STAT OF ADQ CVX/VAG CYTO-IMP: NORMAL

## (undated) DEVICE — TAPE CLOTH MEDIPORE 6 INCH - (12RL/CA)

## (undated) DEVICE — CANISTER SUCTION 3000ML MECHANICAL FILTER AUTO SHUTOFF MEDI-VAC NONSTERILE LF DISP  (40EA/CA)

## (undated) DEVICE — HEAD HOLDER JUNIOR/ADULT

## (undated) DEVICE — SUTURE 3-0 VICRYL PLUS CT-1 - 36 INCH (36/BX)

## (undated) DEVICE — KIT  I.V. START (100EA/CA)

## (undated) DEVICE — SET EXTENSION WITH 2 PORTS (48EA/CA) ***PART #2C8610 IS A SUBSTITUTE*****

## (undated) DEVICE — CATHETER IV NON-SAFETY 18 GA X 1 1/4 (50/BX 4BX/CA)

## (undated) DEVICE — DRESSING INTERCEED ABSORBABLE ADHESION BARRIER TC7 (10EA/CA)

## (undated) DEVICE — LACTATED RINGERS INJ 1000 ML - (14EA/CA 60CA/PF)

## (undated) DEVICE — SUTURE 0 VICRYL PLUS CT 36 (36PK/BX)"

## (undated) DEVICE — STAPLER SKIN DISP - (6/BX 10BX/CA) VISISTAT

## (undated) DEVICE — SUTURE 0 VICRYL PLUS CT-1 - 36 INCH (36/BX)

## (undated) DEVICE — BLANKET UNDERBODY FULL ACCES - (5/CA)

## (undated) DEVICE — GLOVE BIOGEL SZ 8 SURGICAL PF LTX - (50PR/BX 4BX/CA)

## (undated) DEVICE — PACK C-SECTION (2EA/CA)

## (undated) DEVICE — PACK ROOM TURNOVER L&D (12/CA)

## (undated) DEVICE — WATER IRRIGATION STERILE 1000ML (12EA/CA)

## (undated) DEVICE — SUTURE 1 CHROMIC CTX ETHICON - (36PK/BX)

## (undated) DEVICE — SODIUM CHL IRRIGATION 0.9% 1000ML (12EA/CA)

## (undated) DEVICE — PENCIL ELECTSURG 10FT HLSTR - WITH BLADE (50EA/CA)

## (undated) DEVICE — CHLORAPREP 26 ML APPLICATOR - ORANGE TINT(25/CA)

## (undated) DEVICE — SUTURE 2-0 CHROMIC GUT CT-1 27 (36PK/BX)"

## (undated) DEVICE — TUBING CLEARLINK DUO-VENT - C-FLO (48EA/CA)

## (undated) DEVICE — SLEEVE, SEQUENTIAL CALF REG

## (undated) DEVICE — TRAY SPINAL ANESTHESIA NON-SAFETY (10/CA)